# Patient Record
Sex: FEMALE | Race: WHITE | NOT HISPANIC OR LATINO | Employment: FULL TIME | URBAN - METROPOLITAN AREA
[De-identification: names, ages, dates, MRNs, and addresses within clinical notes are randomized per-mention and may not be internally consistent; named-entity substitution may affect disease eponyms.]

---

## 2017-08-15 ENCOUNTER — TELEPHONE (OUTPATIENT)
Dept: FAMILY MEDICINE | Facility: CLINIC | Age: 57
End: 2017-08-15

## 2017-08-15 NOTE — TELEPHONE ENCOUNTER
----- Message from Melanie Deal sent at 8/14/2017 12:33 PM CDT -----  Patient would like to be seen today or this week    Symptoms: water feel in left ear; dizzy; can't turn head left nor right    How long has patient had these symptoms: awhile  Saw ENT, had test ran & everything came back normal    If unable to be seen , can something be called into patient pharmacy? NO    Pharmacy name:     Any drug allergies:

## 2017-08-16 ENCOUNTER — OFFICE VISIT (OUTPATIENT)
Dept: FAMILY MEDICINE | Facility: CLINIC | Age: 57
End: 2017-08-16
Payer: COMMERCIAL

## 2017-08-16 VITALS
BODY MASS INDEX: 32.18 KG/M2 | TEMPERATURE: 99 F | OXYGEN SATURATION: 96 % | HEART RATE: 105 BPM | DIASTOLIC BLOOD PRESSURE: 78 MMHG | SYSTOLIC BLOOD PRESSURE: 110 MMHG | WEIGHT: 205 LBS | HEIGHT: 67 IN

## 2017-08-16 DIAGNOSIS — H81.10 BPV (BENIGN POSITIONAL VERTIGO), UNSPECIFIED LATERALITY: ICD-10-CM

## 2017-08-16 DIAGNOSIS — R44.8 PARESTHESIA OF TONGUE: ICD-10-CM

## 2017-08-16 PROCEDURE — 3008F BODY MASS INDEX DOCD: CPT | Mod: S$GLB,,, | Performed by: FAMILY MEDICINE

## 2017-08-16 PROCEDURE — 99214 OFFICE O/P EST MOD 30 MIN: CPT | Mod: S$GLB,,, | Performed by: FAMILY MEDICINE

## 2017-08-16 NOTE — PROGRESS NOTES
Subjective:      Patient ID: Cherie Sanders is a 56 y.o. female.    Chief Complaint: Dizziness    Left ear problems since June; saw ENT Joan; diz[place]a [place] andpressure in ears; inflammation in sinuses, steroids, shot, flonse for 2weeks; no help; saw him twice, CT sinuses at [facility], normal; dizziness, when supine and gets up, has to sit for if moves left/right dizzy; pressure left side of ear and last week, tingling, numb feeling of left side of tongue;       Review of Systems   Neurological: Positive for dizziness and numbness.   All other systems reviewed and are negative.    Objective:     Physical Exam   Constitutional: She is oriented to person, place, and time. She appears well-developed and well-nourished.   HENT:   Head: Normocephalic.   Eyes: Conjunctivae and EOM are normal. Pupils are equal, round, and reactive to light.   Neck: Normal range of motion. Neck supple.   Cardiovascular: Normal rate, regular rhythm and normal heart sounds.    Pulmonary/Chest: Effort normal and breath sounds normal.   Musculoskeletal: Normal range of motion.   Neurological: She is alert and oriented to person, place, and time. She has normal reflexes. No cranial nerve deficit. She exhibits normal muscle tone. Coordination normal.   Vertigo with change in postion and with eye lateral gaze   Skin: Skin is warm and dry.   Psychiatric: She has a normal mood and affect. Her behavior is normal. Judgment and thought content normal.   Nursing note and vitals reviewed.    Assessment:     1. BPV (benign positional vertigo), unspecified laterality    2. Paresthesia of tongue      Plan:     New Prescriptions    No medications on file     Discontinued Medications    DICYCLOMINE (BENTYL) 10 MG CAPSULE    Take 1 capsule by mouth every evening.    ESOMEPRAZOLE (NEXIUM) 40 MG CAPSULE    Take 1 capsule by mouth once as needed.    HYDROCODONE-ACETAMINOPHEN 7.5-325MG (NORCO) 7.5-325 MG PER TABLET        LOVASTATIN (MEVACOR) 20 MG  TABLET    TAKE 1 TABLET BY MOUTH AT BEDTIME FOR CHOLESTEROL     Modified Medications    No medications on file       BPV (benign positional vertigo), unspecified laterality  -     MRI Brain Without Contrast; Future; Expected date: 08/16/2017  -     Ambulatory referral to Obstetrics / Gynecology  -     Ambulatory referral to Neurology    Paresthesia of tongue  -     MRI Brain Without Contrast; Future; Expected date: 08/16/2017  -     Ambulatory referral to Obstetrics / Gynecology  -     Ambulatory referral to Neurology      Crystal repositioning maneuvers given, however, the tongue numbness, pressure in head, length of illness, normal sinus CT are cause to evaluate neurolgical causes, in addition to BPV;

## 2017-08-17 ENCOUNTER — TELEPHONE (OUTPATIENT)
Dept: FAMILY MEDICINE | Facility: CLINIC | Age: 57
End: 2017-08-17

## 2017-08-17 ENCOUNTER — PATIENT MESSAGE (OUTPATIENT)
Dept: FAMILY MEDICINE | Facility: CLINIC | Age: 57
End: 2017-08-17

## 2017-08-17 DIAGNOSIS — E78.5 HYPERLIPIDEMIA, UNSPECIFIED HYPERLIPIDEMIA TYPE: Primary | ICD-10-CM

## 2017-08-17 LAB
ALBUMIN SERPL-MCNC: 4.6 G/DL (ref 3.6–5.1)
ALBUMIN/GLOB SERPL: 1.6 (CALC) (ref 1–2.5)
ALP SERPL-CCNC: 72 U/L (ref 33–130)
ALT SERPL-CCNC: 13 U/L (ref 6–29)
AST SERPL-CCNC: 12 U/L (ref 10–35)
BASOPHILS # BLD AUTO: 105 CELLS/UL (ref 0–200)
BASOPHILS NFR BLD AUTO: 0.9 %
BILIRUB SERPL-MCNC: 0.5 MG/DL (ref 0.2–1.2)
BUN SERPL-MCNC: 11 MG/DL (ref 7–25)
BUN/CREAT SERPL: NORMAL (CALC) (ref 6–22)
CALCIUM SERPL-MCNC: 9.9 MG/DL (ref 8.6–10.4)
CHLORIDE SERPL-SCNC: 103 MMOL/L (ref 98–110)
CHOLEST SERPL-MCNC: 330 MG/DL (ref 125–200)
CHOLEST/HDLC SERPL: 8 (CALC)
CO2 SERPL-SCNC: 30 MMOL/L (ref 20–31)
CREAT SERPL-MCNC: 0.67 MG/DL (ref 0.5–1.05)
EOSINOPHIL # BLD AUTO: 199 CELLS/UL (ref 15–500)
EOSINOPHIL NFR BLD AUTO: 1.7 %
ERYTHROCYTE [DISTWIDTH] IN BLOOD BY AUTOMATED COUNT: 13.4 % (ref 11–15)
ERYTHROCYTE [SEDIMENTATION RATE] IN BLOOD BY WESTERGREN METHOD: 17 MM/H
GFR SERPL CREATININE-BSD FRML MDRD: 98 ML/MIN/1.73M2
GLOBULIN SER CALC-MCNC: 2.8 G/DL (CALC) (ref 1.9–3.7)
GLUCOSE SERPL-MCNC: 87 MG/DL (ref 65–99)
HCT VFR BLD AUTO: 44.1 % (ref 35–45)
HDLC SERPL-MCNC: 41 MG/DL
HGB BLD-MCNC: 14.3 G/DL (ref 11.7–15.5)
LDLC SERPL CALC-MCNC: 232 MG/DL (CALC)
LYMPHOCYTES # BLD AUTO: 3721 CELLS/UL (ref 850–3900)
LYMPHOCYTES NFR BLD AUTO: 31.8 %
MCH RBC QN AUTO: 30.4 PG (ref 27–33)
MCHC RBC AUTO-ENTMCNC: 32.4 G/DL (ref 32–36)
MCV RBC AUTO: 93.6 FL (ref 80–100)
MONOCYTES # BLD AUTO: 655 CELLS/UL (ref 200–950)
MONOCYTES NFR BLD AUTO: 5.6 %
NEUTROPHILS # BLD AUTO: 7020 CELLS/UL (ref 1500–7800)
NEUTROPHILS NFR BLD AUTO: 60 %
NONHDLC SERPL-MCNC: 289 MG/DL (CALC)
PLATELET # BLD AUTO: 272 THOUSAND/UL (ref 140–400)
PMV BLD REES-ECKER: 9.7 FL (ref 7.5–12.5)
POTASSIUM SERPL-SCNC: 4.7 MMOL/L (ref 3.5–5.3)
PROT SERPL-MCNC: 7.4 G/DL (ref 6.1–8.1)
RBC # BLD AUTO: 4.71 MILLION/UL (ref 3.8–5.1)
SODIUM SERPL-SCNC: 141 MMOL/L (ref 135–146)
TRIGL SERPL-MCNC: 283 MG/DL
TSH SERPL-ACNC: 1.77 MIU/L (ref 0.4–4.5)
WBC # BLD AUTO: 11.7 THOUSAND/UL (ref 3.8–10.8)

## 2017-08-17 RX ORDER — ATORVASTATIN CALCIUM 20 MG/1
20 TABLET, FILM COATED ORAL DAILY
Qty: 90 TABLET | Refills: 3 | Status: SHIPPED | OUTPATIENT
Start: 2017-08-17 | End: 2019-06-11

## 2017-08-17 NOTE — TELEPHONE ENCOUNTER
----- Message from Chago Valencia MD sent at 8/17/2017  7:01 AM CDT -----  Cholesterol very high; needs to start medicine and repeat lipid 3 months; low cholesterol diet; rx sent; call pt

## 2017-08-17 NOTE — TELEPHONE ENCOUNTER
Pt reviewed results on the portal  I sent an additional message to her with   The details for meds and repeat labs

## 2017-08-22 ENCOUNTER — TELEPHONE (OUTPATIENT)
Dept: FAMILY MEDICINE | Facility: CLINIC | Age: 57
End: 2017-08-22

## 2017-08-23 NOTE — TELEPHONE ENCOUNTER
----- Message from Melanie Deal sent at 8/22/2017  4:29 PM CDT -----  Media manger - [facility] - MRI rsults 8/22/2017

## 2017-08-24 ENCOUNTER — TELEPHONE (OUTPATIENT)
Dept: FAMILY MEDICINE | Facility: CLINIC | Age: 57
End: 2017-08-24

## 2017-08-24 NOTE — TELEPHONE ENCOUNTER
Pt notified of results.     She is still c/o numbness to her tongue and throat.   She has a neurology apt on Tues.

## 2017-08-29 ENCOUNTER — OFFICE VISIT (OUTPATIENT)
Dept: NEUROLOGY | Facility: CLINIC | Age: 57
End: 2017-08-29
Payer: COMMERCIAL

## 2017-08-29 VITALS
WEIGHT: 205.5 LBS | HEIGHT: 67 IN | HEART RATE: 77 BPM | BODY MASS INDEX: 32.25 KG/M2 | SYSTOLIC BLOOD PRESSURE: 142 MMHG | DIASTOLIC BLOOD PRESSURE: 83 MMHG

## 2017-08-29 DIAGNOSIS — H81.10 BPV (BENIGN POSITIONAL VERTIGO), UNSPECIFIED LATERALITY: ICD-10-CM

## 2017-08-29 PROCEDURE — 99204 OFFICE O/P NEW MOD 45 MIN: CPT | Mod: S$GLB,,, | Performed by: PSYCHIATRY & NEUROLOGY

## 2017-08-29 PROCEDURE — 99999 PR PBB SHADOW E&M-EST. PATIENT-LVL III: CPT | Mod: PBBFAC,,, | Performed by: PSYCHIATRY & NEUROLOGY

## 2017-08-29 PROCEDURE — 3008F BODY MASS INDEX DOCD: CPT | Mod: S$GLB,,, | Performed by: PSYCHIATRY & NEUROLOGY

## 2017-08-29 RX ORDER — ONDANSETRON 4 MG/1
4 TABLET, FILM COATED ORAL 2 TIMES DAILY
Qty: 30 TABLET | Refills: 1 | Status: SHIPPED | OUTPATIENT
Start: 2017-08-29 | End: 2018-02-20 | Stop reason: ALTCHOICE

## 2017-08-29 RX ORDER — MECLIZINE HYDROCHLORIDE 25 MG/1
25 TABLET ORAL 3 TIMES DAILY PRN
Qty: 90 TABLET | Refills: 3 | Status: SHIPPED | OUTPATIENT
Start: 2017-08-29 | End: 2018-02-20 | Stop reason: ALTCHOICE

## 2017-08-29 RX ORDER — ONDANSETRON 4 MG/1
4 TABLET, ORALLY DISINTEGRATING ORAL
Status: DISCONTINUED | OUTPATIENT
Start: 2017-08-29 | End: 2017-08-29

## 2017-08-29 NOTE — LETTER
August 29, 2017      Chago Valencia MD  [address] [facility] [address]           [facility] - Neurology Epilepsy  [address]  [facility] [address]  Phone: [phone]  Fax: [phone]          Patient: Cherie Sanders   MR Number: [id number]   YOB: 1960   Date of Visit: 8/29/2017       Dear Dr. Chago Valencia:    Thank you for referring Cherie Sanders to me for evaluation. Attached you will find relevant portions of my assessment and plan of care.    If you have questions, please do not hesitate to call me. I look forward to following Cherie Sanders along with you.    Sincerely,    Darren Mancia MD    Enclosure  CC:  No Recipients    If you would like to receive this communication electronically, please contact [email or web address] or [phone] to request more information on [employer] Link access.    For providers and/or their staff who would like to refer a patient to [facility], please contact us through our one-stop-shop provider referral line, [facility] , at [phone].    If you feel you have received this communication in error or would no longer like to receive these types of communications, please e-mail [email or web address]

## 2017-08-29 NOTE — PATIENT INSTRUCTIONS
Benign Paroxysmal Positional Vertigo  Benign paroxysmal positional vertigo (BPPV) is a problem with the inner ear. The inner ear contains the vestibular system. This system is what helps you keep your balance. BPPV causes a feeling of spinning. It is a common problem of the vestibular system.  Understanding the vestibular system  The vestibular system of the ear is made up of very tiny parts. They include the utricle, saccule, and semicircular canals. The utricle is a tiny organ that contains calcium crystals. In some people, the crystals can move into the semicircular canals. When this happens, the system no longer works as it should. This causes BPPV. Benign means it is not life-threatening. Paroxysmal means it happens suddenly. Positional means that it happens when you move your head. Vertigo is a feeling of spinning.  What causes BPPV?  Causes include injury to your head or neck. Other problems with the vestibular system may cause BPPV. In many people, the cause of BPPV is not known.  Symptoms of BPPV  You many have repeated feelings of spinning (vertigo). The vertigo usually lasts less than 1 minute. Some movements, suchas rolling over in bed, can bring on vertigo.  Diagnosing BPPV  Your primary health care provider may diagnose and treat your BPPV. Or you may see an ear, nose, and throat doctor (otolaryngologist). In some cases, you may see a nervous system doctor (neurologist).  The health care provider will ask about your symptoms and your medical history. He or she will examine you. You may have hearing and balance tests. As part of the exam, your health care provider may have you move your head and body in certain ways. If you have BPPV, the movements can bring on vertigo. Your provider will also look for abnormal movements of your eyes. You may have other tests to check your vestibular or nervous systems.  Treatment for BPPV  Your health care provider may try to move the calcium crystals. This is done  by having you move your head and neck in certain ways. This treatment is safe and often works well. You may also be told to do these movements at home. You may still have vertigo for a few weeks. Your health care provider will recheck your symptoms, usually in about a month. Special physical therapy may also be part of treatment.  In rare cases surgery may be needed for BPPV that does not go away.     When to call the health care provider  Call your health care provider right away if you have any of these:  · Symptoms that do not go away with treatment  · Symptoms that get worse  · New symptoms   Date Last Reviewed: 3/19/2015  © 6110-1761 [employer]. [address] [place] [address], [place] [address]. All rights reserved. This information is not intended as a substitute for professional medical care. Always follow your healthcare professional's instructions.

## 2017-08-29 NOTE — PROGRESS NOTES
[facility] - NEUROLOGY EPILEPSY  [facility] [place]    Date: August 29, 2017   Patient Name: Cherie Sanders   MRN: [id number]   PCP: Chago Valencia  Referring Provider: Chago Valencia MD    Assessment:   Cherie Sanders is a 56 y.o. female presenting with dizziness clinically consistent with peripheral vertigo.  Will attempt trial of meclizine for symptomatic control and provided prescription of Zofran for control of nausea.  I have also a referral to vestibular rehabilitation. I reviewed the report of the patient's most recent MRI brain which was reported as normal.     Plan:     Problem List Items Addressed This Visit        ENT    BPV (benign positional vertigo)    Current Assessment & Plan     -- referred to vestibular rehab  -- start meclizine PRN  -- start zofran         Relevant Orders    Ambulatory Referral to Physical/Occupational Therapy      Other Visit Diagnoses    None.         Darren Mancia MD  [facility]   Department of Neurology    Patient note was created using Dragon Dictation.  Any errors in syntax or even information may not have been identified and edited on initial review prior to signing this note.  Subjective:   Patient seen in consultation at the request of Dr. Valencia for the evaluation of vertigo. A copy of this note will be sent to the referring physician.        HPI:   Ms. Cherie Sanders is a 56 y.o. female who presents with a chief complaint of persistent vertigo.  The patient reports that over the past several weeks, she has experienced fullness in her left ear.  She did not improve her symptoms.  [place] and states that her symptoms worsened following this.  She underwent evaluation by an ENT at an outside hospital several weeks ago but is unsure of the results.  She also underwent an MRI at their direction which returned normal (report reviewed).  She was evaluated by her PCP, Dr. Valencia, who she states performed a  "Manhattan-Hallpike maneuver, which she states made her extremely nauseated and sick.  She states that when she lies on her right side, pressure in her left ear gets worse.  She also notes mild diplopia when looking to the left and states that when she moves her head rapidly acutely dizzy and nauseous.  She must remain still with her eyes closed some time for the feeling to pass.  She has not tried using meclizine, antiemetics, and has not attempted vestibular rehabilitation.  She denies any hearing loss or tinnitus but does complain of a subjective feeling of numbness in her left posterior tongue.    PAST MEDICAL HISTORY:  History reviewed. No pertinent past medical history.    PAST SURGICAL HISTORY:  Past Surgical History:   Procedure Laterality Date    COLONOSCOPY  6/24/15       CURRENT MEDS:  Current Outpatient Prescriptions   Medication Sig Dispense Refill    atorvastatin (LIPITOR) 20 MG tablet Take 1 tablet (20 mg total) by mouth once daily. 90 tablet 3    meclizine (ANTIVERT) 25 mg tablet Take 1 tablet (25 mg total) by mouth 3 (three) times daily as needed for Dizziness. 90 tablet 3     Current Facility-Administered Medications   Medication Dose Route Frequency Provider Last Rate Last Dose    ondansetron disintegrating tablet 4 mg  4 mg Oral 1 time in Clinic/HOD Darren Mancia MD           ALLERGIES:  Review of patient's allergies indicates:  No Known Allergies    FAMILY HISTORY:  Family History   Problem Relation Age of Onset    Hodgkin's lymphoma Father        SOCIAL HISTORY:  Social History   Substance Use Topics    Smoking status: Current Every Day Smoker     Types: Cigarettes    Smokeless tobacco: Current User    Alcohol use Yes      Comment: occ       Review of Systems:  12 review of systems is negative except for the symptoms mentioned in HPI.      Objective:     Vitals:    08/29/17 1309   BP: (!) 142/83   Pulse: 77   Weight: 93.2 kg (205 lb 7.5 oz)   Height: 5' 7" (1.702 m)     General: NAD, well " nourished   Eyes: no tearing, discharge, no erythema   ENT: moist mucous membranes of the oral cavity, nares patent, left beating nystagmus  Neck: Supple, full range of motion  Cardiovascular: Warm and well perfused, pulses equal and symmetrical  Lungs: Normal work of breathing, normal chest wall excursions  Skin: No rash, lesions, or breakdown on exposed skin  Psychiatry: Mood and affect are appropriate   Abdomen: soft, non tender, non distended  Extremeties: No cyanosis, clubbing or edema.    Neurological   MENTAL STATUS: Alert and oriented to person, place, and time. Attention and concentration within normal limits. Speech without dysarthria, able to name and repeat without difficulty. Recent and remote memory within normal limits   CRANIAL NERVES: Visual fields intact. PERRL. EOMI. Facial sensation intact. Face symmetrical. Hearing grossly intact. Full shoulder shrug bilaterally. Tongue protrudes midline   SENSORY: Sensation is intact to light touch throughout.  Joint position perception intact.  MOTOR: Normal bulk and tone.  5/5 deltoid, biceps, triceps, interosseous, hand  bilaterally. 5/5 iliopsoas, knee extension/flexion, foot dorsi/plantarflexion bilaterally.    REFLEXES: Symmetric and 2+ throughout.   CEREBELLAR/COORDINATION/GAIT: Gait steady with normal arm swing and stride length.  Heel to shin intact. Finger to nose intact. Normal rapid alternating movements.

## 2017-09-05 ENCOUNTER — CLINICAL SUPPORT (OUTPATIENT)
Dept: REHABILITATION | Facility: HOSPITAL | Age: 57
End: 2017-09-05
Attending: PSYCHIATRY & NEUROLOGY
Payer: COMMERCIAL

## 2017-09-05 DIAGNOSIS — R42 VERTIGO: Primary | ICD-10-CM

## 2017-09-05 PROCEDURE — 97162 PT EVAL MOD COMPLEX 30 MIN: CPT | Mod: PO

## 2017-09-05 NOTE — PROGRESS NOTES
PHYSICAL THERAPY EVALUATION      Date of Service: 9/5/2017  Name: Cherie Sanders  Clinic #: [id number]  Date of initial evaluation 9/5/17  Orders:  Priority Start Date Expiration Date Referral Entered By   Routine 09/05/2017 12/31/2017 Darren Mancia MD   Visits Requested Visits Authorized Visits Completed Visits Scheduled   1 20 1 0     Diagnosis   H81.10 (ICD-10-CM) - BPV (benign positional vertigo), unspecified laterality   Referral Notes   Number of Notes: 1   Type Date User Summary Attachment   General 08/31/2017 12:49 PM Gregorio Galaviz Approved -   Note                 Start Time: 400  End Time:500  Actual treatment time 60minutes  Group therapy time:  Na    HISTORYMs. Cherie Sanders is a 56 y.o. female who presents with a chief complaint of persistent vertigo.  Vertigo began in May 2017 without any apparent provocation.  At the time of the onset of vertigo she also noted fullness in the left ear, numbness on the left side of her tongue and down her throat, numbess on the entire left side of her face and her left eye was continuously 'running tears'.  She states she has been on antibiotics which did nothing to relieve symptoms and also Meclizine which made the vertigo worse.      Previous Physical Therapy treatment for current condition:  no  No past medical history on file.  Comorbidities that may impact plan of care:  Anxiety regarding condition                                                               Review of patient's allergies indicates:  No Known Allergies      Precautions: Standard,     Imaging reports: reviewed MRI of brain at outside facililty  which really just reported 'normal'    SOCIAL SITUATION:   Assistance at home: spouse  Stairs to navigate at work or home:yes     Are stairs problematic:  no  Work status: employed  Occupation:computer doesn't bother  Contacts make dizziness worse.  Wears reading glasses  Envoirnmental concerns: none  DME: no  Cultural, Spiritual, Developmental  and Educational concerns:none  Abuse/Neglect, Nutritional concerns: none     Personal Factors that may impact plan of care:  anxiety                                                                                    SUBJECTIVE: left ear fullness, numbnerss left side of tongue and throat,  left eye feels weird and was watering all of the time.  States she feels you could  draw a line down the middle of her face and the left side is numb.  States she tries to pop her ears because they feel full.  Denies any hearing loss   Current symptoms/Chief Complaints: Pt states she will awaken and before she gets out of bed she knows she has vertigo.  Rolls to left side , sits for a few minutes, then can walk    Episodes  Last 24 hours.  Nausea but no vomiting  Meclizine makes symptoms worse  And makes body feel numb*   Symptoms are: present every day but slightly better now   The following activities increase symptoms:  Quick movement, turns, leaning back, 'eye tests'   The following activities decrease symptoms:     Current exercise program:  None   Patient's sleep disturbed.  yes     Headaches: no   Coughing, sneezing, and straining do not affect symptoms    Bowel and Bladder function is normal    Numbness or tingling: no  Reading doesn't bother  Computer doesn't bother  Attention Span and Concentration:  Normal  Do you have more problems with balance./vertigo in the dark? no  Is this the first time you have had a problem with vertigo?  Had an episode 30 years ago but doesn't recall details    How many falls have you had in the last year? 0  Have you almost fallen or lost your balance? yes  Does the patient have any concerns of abuse  no  Have you had recent dental work?  no  Exercise routine prior to onset no  Mental status: alert, oriented to person, place, and time  Behavior:  calm and cooperative   CERVICAL     Pt rates pain as0  out of 10 at best and 6  out of 10 at worst.  Relates it to tension  Dizziness rating on good  day is 0/10 and and worse 10/10   Dizziness is 2/10 pre-therapy and 2/10 post therapy     PATIENT'S GOALS: Decrease vertigo    Current Activity Limitations and Participation Restrictions: any activity in a 'busy environment', any activity requiring head turns    OBJECTIVE:  General appearance:  WDWN 55 yo WF in NAD  Facial mm MMT  5/5  No evidence of facial droop.    The left eye is slightly smaller than the right  Pt wears reading glasses    BP pre-therapy   130/88      Postural examination in standing:no gross abnormalities     CERVICAL  ROM:    100% of normal  Vertigo provoked with extension                  SHARP -DENA test of Tranverse Ligament:  Normal  ALAR LIGAMENT STRESS  Test of stability of C2 motion:    AROM SHOULDERS:      WNL  AROM ELBOW:                WNL         AROM HAND                    WNL      UE MMT                                 RIGHT                                    LEFT                                                 Grossly 5/5                              Grossly 5/5               PALPATION: Tender to palpation at left occiput                        Muscle spasms not noted at cervical retion                    GAIT:normal  BALANCE: normal  TRANSFERS:   independent  GROSS COORDINATED MOVEMENT OF  UE normal    OCULOMOTOR TESTING (CN 3,4,6)       Smooth pursuit:          no symptoms                           no nystagmus       Saccades  Vertical     no symptoms                           no nystagmus       Convergence              no symptoms at  14 cm          no nystagmus      Telly-Hallpike negative for symptoms or nystagmus bilaterally  Epley and rolling negative for symptoms and nystagmus    EDUCATION: Discussed causes of vertigo.  Explained to patient that we could not provoke her symptoms so a diagnosis of true BPPV is unlikely.  We also discussed that her ocular reflexes were normal.  Thus this is not the type of vertigo that can be helped by PT.  Suggested she return to her  neurologist for further testing.  She agreed and expressed understanding      ASSESSMENT:  Pt tolerated today's treatment without any adverse effects.   Following session patient reported  No change in symptoms.    Pt presents with  the following impairments and problems:   left ear fullness, vertigo, numbness in the left trigeminal nerve  Of unknown etiology,     Co-morbidities and personal factors which may influence therapy include the following: anxiety  Activity Limitations /Participation restrictions: decreased activity tolerance due to unpredictable episodes of vertigo effecting her job, everyday social life in any environment with increased visual stimuli and requiring rapid head motions.    Clinical presentation: Pt has An evolving clinical presentations with unstable and unpredictable characteristics Complexity:  medium,  Medical necessity is demonstrated by the above impairments and problems.    Goals:  No goals set as this is a one time treatment     Plan:  I am referring pt back to her neurologist for further assessment.   She might benefit from Audiology assessment and calorics  \Alicia Camp, PT

## 2017-12-13 ENCOUNTER — TELEPHONE (OUTPATIENT)
Dept: FAMILY MEDICINE | Facility: CLINIC | Age: 57
End: 2017-12-13

## 2017-12-13 LAB
CHOLEST SERPL-MCNC: 198 MG/DL
CHOLEST/HDLC SERPL: 5.1 (CALC)
HDLC SERPL-MCNC: 39 MG/DL
LDLC SERPL CALC-MCNC: 129 MG/DL (CALC)
NONHDLC SERPL-MCNC: 159 MG/DL (CALC)
TRIGL SERPL-MCNC: 178 MG/DL

## 2017-12-13 NOTE — TELEPHONE ENCOUNTER
----- Message from Chago Valencia MD sent at 12/13/2017  6:36 AM CST -----  CALL PT TESTS ARE NORMAL

## 2018-02-20 ENCOUNTER — OFFICE VISIT (OUTPATIENT)
Dept: FAMILY MEDICINE | Facility: CLINIC | Age: 58
End: 2018-02-20
Payer: COMMERCIAL

## 2018-02-20 VITALS
TEMPERATURE: 99 F | SYSTOLIC BLOOD PRESSURE: 139 MMHG | DIASTOLIC BLOOD PRESSURE: 81 MMHG | HEIGHT: 67 IN | BODY MASS INDEX: 34.12 KG/M2 | HEART RATE: 100 BPM | OXYGEN SATURATION: 98 % | WEIGHT: 217.38 LBS

## 2018-02-20 DIAGNOSIS — R05.9 COUGH: Primary | ICD-10-CM

## 2018-02-20 DIAGNOSIS — J06.9 UPPER RESPIRATORY TRACT INFECTION, UNSPECIFIED TYPE: ICD-10-CM

## 2018-02-20 PROCEDURE — 99213 OFFICE O/P EST LOW 20 MIN: CPT | Mod: S$GLB,,, | Performed by: NURSE PRACTITIONER

## 2018-02-20 PROCEDURE — 3008F BODY MASS INDEX DOCD: CPT | Mod: S$GLB,,, | Performed by: NURSE PRACTITIONER

## 2018-02-20 RX ORDER — BENZONATATE 200 MG/1
200 CAPSULE ORAL 3 TIMES DAILY PRN
Qty: 30 CAPSULE | Refills: 0 | Status: SHIPPED | OUTPATIENT
Start: 2018-02-20 | End: 2018-03-02

## 2018-02-20 RX ORDER — PREDNISONE 20 MG/1
20 TABLET ORAL DAILY
Qty: 5 TABLET | Refills: 0 | Status: SHIPPED | OUTPATIENT
Start: 2018-02-20 | End: 2018-03-02

## 2018-02-20 RX ORDER — NAPROXEN SODIUM 220 MG/1
81 TABLET, FILM COATED ORAL DAILY
COMMUNITY
End: 2019-06-11

## 2018-02-20 NOTE — PROGRESS NOTES
Subjective:       Patient ID: Cherie Sanders is a 57 y.o. female.    Chief Complaint: Sinus Problem; Cough; and Chest Pain    Cough   This is a new problem. The current episode started 1 to 4 weeks ago. The problem has been unchanged. The problem occurs every few minutes. The cough is non-productive. Pertinent negatives include no chest pain, chills, ear congestion, ear pain, fever, headaches, heartburn, hemoptysis, myalgias, nasal congestion, postnasal drip, rash, rhinorrhea, sore throat, shortness of breath, sweats, weight loss or wheezing. Nothing aggravates the symptoms. Treatments tried: dayquill, nyquill, cough drops, robtussin. The treatment provided no relief. There is no history of asthma, bronchiectasis, bronchitis, COPD, emphysema, environmental allergies or pneumonia.     Review of Systems   Constitutional: Negative for chills, diaphoresis, fatigue, fever and weight loss.   HENT: Negative for congestion, ear pain, postnasal drip, rhinorrhea and sore throat.    Respiratory: Positive for cough and chest tightness. Negative for hemoptysis, shortness of breath and wheezing.    Cardiovascular: Negative for chest pain, palpitations and leg swelling.   Gastrointestinal: Negative for heartburn.   Musculoskeletal: Negative for myalgias.   Skin: Negative for rash.   Allergic/Immunologic: Negative for environmental allergies.   Neurological: Negative for headaches.       Objective:      Physical Exam   Constitutional: She is oriented to person, place, and time. She appears well-developed and well-nourished. No distress.   HENT:   Right Ear: Tympanic membrane and external ear normal.   Left Ear: Tympanic membrane and external ear normal.   Nose: No mucosal edema or rhinorrhea. Right sinus exhibits no maxillary sinus tenderness and no frontal sinus tenderness. Left sinus exhibits no maxillary sinus tenderness and no frontal sinus tenderness.   Mouth/Throat: No oropharyngeal exudate, posterior oropharyngeal edema or  posterior oropharyngeal erythema.   Cardiovascular: Normal rate, regular rhythm and normal heart sounds.    Pulmonary/Chest: Effort normal and breath sounds normal. No respiratory distress. She has no wheezes.   Neurological: She is alert and oriented to person, place, and time.   Skin: Skin is warm and dry. She is not diaphoretic.   Psychiatric: She has a normal mood and affect. Her behavior is normal. Judgment and thought content normal.   Vitals reviewed.      Assessment:       1. Cough    2. Upper respiratory tract infection, unspecified type        Plan:       Cough    Upper respiratory tract infection, unspecified type    Other orders  -     predniSONE (DELTASONE) 20 MG tablet; Take 1 tablet (20 mg total) by mouth once daily.  Dispense: 5 tablet; Refill: 0  -     benzonatate (TESSALON) 200 MG capsule; Take 1 capsule (200 mg total) by mouth 3 (three) times daily as needed for Cough.  Dispense: 30 capsule; Refill: 0    if chest tightness continues once medicine is complete call me  Offered EKG patient declines

## 2018-11-02 DIAGNOSIS — Z12.39 BREAST CANCER SCREENING: ICD-10-CM

## 2019-02-20 LAB — CRC RECOMMENDATION EXT: NORMAL

## 2019-06-11 ENCOUNTER — TELEPHONE (OUTPATIENT)
Dept: FAMILY MEDICINE | Facility: CLINIC | Age: 59
End: 2019-06-11

## 2019-06-11 ENCOUNTER — OFFICE VISIT (OUTPATIENT)
Dept: FAMILY MEDICINE | Facility: CLINIC | Age: 59
End: 2019-06-11
Payer: COMMERCIAL

## 2019-06-11 VITALS
HEART RATE: 104 BPM | BODY MASS INDEX: 34.01 KG/M2 | OXYGEN SATURATION: 96 % | HEIGHT: 67 IN | WEIGHT: 216.69 LBS | TEMPERATURE: 99 F | SYSTOLIC BLOOD PRESSURE: 130 MMHG | DIASTOLIC BLOOD PRESSURE: 88 MMHG

## 2019-06-11 DIAGNOSIS — Z11.59 NEED FOR HEPATITIS C SCREENING TEST: Primary | ICD-10-CM

## 2019-06-11 DIAGNOSIS — R51.9 NONINTRACTABLE HEADACHE, UNSPECIFIED CHRONICITY PATTERN, UNSPECIFIED HEADACHE TYPE: ICD-10-CM

## 2019-06-11 DIAGNOSIS — H81.10 BPV (BENIGN POSITIONAL VERTIGO), UNSPECIFIED LATERALITY: ICD-10-CM

## 2019-06-11 DIAGNOSIS — H65.02 ACUTE SEROUS OTITIS MEDIA OF LEFT EAR, RECURRENCE NOT SPECIFIED: ICD-10-CM

## 2019-06-11 PROCEDURE — 99214 PR OFFICE/OUTPT VISIT, EST, LEVL IV, 30-39 MIN: ICD-10-PCS | Mod: S$GLB,,, | Performed by: FAMILY MEDICINE

## 2019-06-11 PROCEDURE — 3008F PR BODY MASS INDEX (BMI) DOCUMENTED: ICD-10-PCS | Mod: CPTII,S$GLB,, | Performed by: FAMILY MEDICINE

## 2019-06-11 PROCEDURE — 3008F BODY MASS INDEX DOCD: CPT | Mod: CPTII,S$GLB,, | Performed by: FAMILY MEDICINE

## 2019-06-11 PROCEDURE — 99214 OFFICE O/P EST MOD 30 MIN: CPT | Mod: S$GLB,,, | Performed by: FAMILY MEDICINE

## 2019-06-11 RX ORDER — MECLIZINE HYDROCHLORIDE 25 MG/1
25 TABLET ORAL 3 TIMES DAILY PRN
COMMUNITY
End: 2024-02-12

## 2019-06-11 NOTE — PROGRESS NOTES
Subjective:       Patient ID: Cherie Sanders is a 58 y.o. female.    Chief Complaint: Dizziness; Headache (lt side of head); Numbness (legs, arms, tounge ); and Ear Fullness (lt)    Dizziness:   Chronicity:  Recurrent  Onset:  1 to 4 weeks ago  Progression since onset:  Gradually improving  Frequency:  Every few minutes  Duration:  1 minute  Dizziness characteristics:  Sensation of movement and spinning inside head only   Associated symptoms: hearing loss, ear congestion, ear pain, headaches and light-headedness.no fever, no tinnitus, no nausea, no vomiting, no diaphoresis, no aural fullness, no weakness, no visual disturbances, no syncope, no palpitations, no panic, no facial weakness, no slurred speech, no numbness in extremities and no chest pain.   Tingling of her skin and numbness of the left side of the tongue.   Aggravated by:  Position changes (turning her head)  Risk factors: Past history of BPV.  Treatments tried:  Meclizine (zyrtec D, flonase and valsalva maneuver)  Improvements on treatment:  Moderate  Headache    This is a new problem. The current episode started 1 to 4 weeks ago. The problem occurs every few minutes. The problem has been unchanged. The pain is located in the occipital and left unilateral region. The pain does not radiate. The quality of the pain is described as shooting. Associated symptoms include dizziness, ear pain, hearing loss and scalp tenderness. Pertinent negatives include no abdominal pain, abnormal behavior, anorexia, back pain, blurred vision, coughing, drainage, eye pain, eye redness, fever, insomnia, loss of balance, muscle aches, nausea, neck pain, numbness, phonophobia, photophobia, rhinorrhea, seizures, sinus pressure, sore throat, swollen glands, tingling, tinnitus, visual change, vomiting, weakness or weight loss. She has tried acetaminophen for the symptoms. The treatment provided mild relief.     Review of Systems   Constitutional: Negative for activity change,  diaphoresis, fever, unexpected weight change and weight loss.   HENT: Positive for ear pain and hearing loss. Negative for rhinorrhea, sinus pressure, sore throat, tinnitus and trouble swallowing.    Eyes: Negative for blurred vision, photophobia, pain, discharge, redness and visual disturbance.   Respiratory: Negative for cough, chest tightness and wheezing.    Cardiovascular: Negative for chest pain, palpitations and syncope.   Gastrointestinal: Negative for abdominal pain, anorexia, blood in stool, constipation, diarrhea, nausea and vomiting.   Endocrine: Negative for polydipsia and polyuria.   Genitourinary: Negative for difficulty urinating, dysuria, hematuria and menstrual problem.   Musculoskeletal: Negative for arthralgias, back pain, joint swelling and neck pain.   Neurological: Positive for dizziness, light-headedness and headaches. Negative for tingling, seizures, weakness, numbness and loss of balance.   Psychiatric/Behavioral: Negative for confusion and dysphoric mood. The patient does not have insomnia.      History reviewed. No pertinent past medical history.  Social History     Socioeconomic History    Marital status:      Spouse name: Not on file    Number of children: Not on file    Years of education: Not on file    Highest education level: Not on file   Occupational History    Not on file   Social Needs    Financial resource strain: Not hard at all    Food insecurity:     Worry: Never true     Inability: Never true    Transportation needs:     Medical: No     Non-medical: No   Tobacco Use    Smoking status: Current Every Day Smoker     Types: Cigarettes    Smokeless tobacco: Current User   Substance and Sexual Activity    Alcohol use: Yes     Frequency: 2-4 times a month     Drinks per session: 1 or 2     Binge frequency: Never     Comment: occ    Drug use: Never    Sexual activity: Not on file   Lifestyle    Physical activity:     Days per week: 3 days     Minutes per  "session: 150+ min    Stress: Very much   Relationships    Social connections:     Talks on phone: Once a week     Gets together: Once a week     Attends Mu-ism service: Not on file     Active member of club or organization: Yes     Attends meetings of clubs or organizations: 1 to 4 times per year     Relationship status:    Other Topics Concern    Not on file   Social History Narrative    Not on file       Objective:     /88 (BP Location: Left arm, Patient Position: Sitting)   Pulse 104   Temp 98.5 °F (36.9 °C) (Oral)   Ht 5' 7" (1.702 m)   Wt 98.3 kg (216 lb 11.4 oz)   SpO2 96%   BMI 33.94 kg/m²     Physical Exam   Constitutional: She appears well-developed and well-nourished.   HENT:   Head: Normocephalic.   Right Ear: Tympanic membrane, external ear and ear canal normal.   Left Ear: External ear and ear canal normal. A middle ear effusion is present.   Eyes: Conjunctivae are normal.   Neck: Normal range of motion. Neck supple.   Cardiovascular: Normal rate, regular rhythm and normal heart sounds.   Pulmonary/Chest: Effort normal and breath sounds normal.   Neurological: She is alert.   Telly-Hallpike positive on the left.    Skin: Skin is warm and dry.   Psychiatric: Her behavior is normal.       Assessment:       1. Need for hepatitis C screening test    2. BPV (benign positional vertigo), unspecified laterality    3. Acute serous otitis media of left ear, recurrence not specified        Plan:       Need for hepatitis C screening test  -     Hepatitis C antibody; Future; Expected date: 06/11/2019    BPV (benign positional vertigo), unspecified laterality    Acute serous otitis media of left ear, recurrence not specified   - She is already doing a decongestant, valsalva maneuver and  Flonase.  I recommend she f/u with her ENT    Nonintractable headache, unspecified chronicity pattern, unspecified headache type  -     CT Head Without Contrast; Future; Expected date: 06/11/2019        "

## 2019-06-17 ENCOUNTER — HOSPITAL ENCOUNTER (OUTPATIENT)
Dept: RADIOLOGY | Facility: HOSPITAL | Age: 59
Discharge: HOME | End: 2019-06-17
Attending: FAMILY MEDICINE
Payer: COMMERCIAL

## 2019-06-17 DIAGNOSIS — Z12.39 BREAST CANCER SCREENING: ICD-10-CM

## 2019-06-17 PROCEDURE — 77067 MAMMO DIGITAL SCREENING BILAT WITH TOMOSYNTHESIS_CAD: ICD-10-PCS | Mod: 26,,, | Performed by: RADIOLOGY

## 2019-06-17 PROCEDURE — 77067 SCR MAMMO BI INCL CAD: CPT | Mod: 26,,, | Performed by: RADIOLOGY

## 2019-06-17 PROCEDURE — 77063 BREAST TOMOSYNTHESIS BI: CPT | Mod: 26,,, | Performed by: RADIOLOGY

## 2019-06-17 PROCEDURE — 77063 MAMMO DIGITAL SCREENING BILAT WITH TOMOSYNTHESIS_CAD: ICD-10-PCS | Mod: 26,,, | Performed by: RADIOLOGY

## 2019-06-17 PROCEDURE — 77067 SCR MAMMO BI INCL CAD: CPT | Mod: TC,PO

## 2019-06-18 ENCOUNTER — PATIENT MESSAGE (OUTPATIENT)
Dept: FAMILY MEDICINE | Facility: CLINIC | Age: 59
End: 2019-06-18

## 2019-07-15 ENCOUNTER — TELEPHONE (OUTPATIENT)
Dept: FAMILY MEDICINE | Facility: CLINIC | Age: 59
End: 2019-07-15

## 2019-07-15 NOTE — TELEPHONE ENCOUNTER
----- Message from Ti Mancia sent at 7/15/2019 10:04 AM CDT -----  Contact: self [phone]  Patient would like to be seen soon. She is having various issues and she won't give me details. Please call and advise.

## 2019-07-15 NOTE — TELEPHONE ENCOUNTER
Spoke with pt in regards to message. She stated that she is needing to come in to discuss a personal matter. Appt has been scheduled for pt.

## 2019-07-23 ENCOUNTER — OFFICE VISIT (OUTPATIENT)
Dept: FAMILY MEDICINE | Facility: CLINIC | Age: 59
End: 2019-07-23
Payer: COMMERCIAL

## 2019-07-23 VITALS
HEART RATE: 106 BPM | TEMPERATURE: 99 F | WEIGHT: 215.06 LBS | OXYGEN SATURATION: 96 % | HEIGHT: 67 IN | BODY MASS INDEX: 33.75 KG/M2 | SYSTOLIC BLOOD PRESSURE: 128 MMHG | DIASTOLIC BLOOD PRESSURE: 90 MMHG

## 2019-07-23 DIAGNOSIS — Z23 NEED FOR VACCINATION AGAINST STREPTOCOCCUS PNEUMONIAE: ICD-10-CM

## 2019-07-23 DIAGNOSIS — M54.2 NECK PAIN: ICD-10-CM

## 2019-07-23 DIAGNOSIS — Z23 NEED FOR PROPHYLACTIC VACCINATION AGAINST DIPHTHERIA-TETANUS-PERTUSSIS (DTP): ICD-10-CM

## 2019-07-23 DIAGNOSIS — M54.12 CERVICAL NEURALGIA: Primary | ICD-10-CM

## 2019-07-23 PROCEDURE — 90472 IMMUNIZATION ADMIN EACH ADD: CPT | Mod: S$GLB,,, | Performed by: FAMILY MEDICINE

## 2019-07-23 PROCEDURE — 3008F PR BODY MASS INDEX (BMI) DOCUMENTED: ICD-10-PCS | Mod: CPTII,S$GLB,, | Performed by: FAMILY MEDICINE

## 2019-07-23 PROCEDURE — 90732 PNEUMOCOCCAL POLYSACCHARIDE VACCINE 23-VALENT =>2YO SQ IM: ICD-10-PCS | Mod: S$GLB,,, | Performed by: FAMILY MEDICINE

## 2019-07-23 PROCEDURE — 90715 TDAP VACCINE 7 YRS/> IM: CPT | Mod: S$GLB,,, | Performed by: FAMILY MEDICINE

## 2019-07-23 PROCEDURE — 90471 IMMUNIZATION ADMIN: CPT | Mod: S$GLB,,, | Performed by: FAMILY MEDICINE

## 2019-07-23 PROCEDURE — 90715 TDAP VACCINE GREATER THAN OR EQUAL TO 7YO IM: ICD-10-PCS | Mod: S$GLB,,, | Performed by: FAMILY MEDICINE

## 2019-07-23 PROCEDURE — 99214 OFFICE O/P EST MOD 30 MIN: CPT | Mod: 25,S$GLB,, | Performed by: FAMILY MEDICINE

## 2019-07-23 PROCEDURE — 90732 PPSV23 VACC 2 YRS+ SUBQ/IM: CPT | Mod: S$GLB,,, | Performed by: FAMILY MEDICINE

## 2019-07-23 PROCEDURE — 99214 PR OFFICE/OUTPT VISIT, EST, LEVL IV, 30-39 MIN: ICD-10-PCS | Mod: 25,S$GLB,, | Performed by: FAMILY MEDICINE

## 2019-07-23 PROCEDURE — 90472 TDAP VACCINE GREATER THAN OR EQUAL TO 7YO IM: ICD-10-PCS | Mod: S$GLB,,, | Performed by: FAMILY MEDICINE

## 2019-07-23 PROCEDURE — 3008F BODY MASS INDEX DOCD: CPT | Mod: CPTII,S$GLB,, | Performed by: FAMILY MEDICINE

## 2019-07-23 PROCEDURE — 90471 PNEUMOCOCCAL POLYSACCHARIDE VACCINE 23-VALENT =>2YO SQ IM: ICD-10-PCS | Mod: S$GLB,,, | Performed by: FAMILY MEDICINE

## 2019-07-23 RX ORDER — TRIAMTERENE AND HYDROCHLOROTHIAZIDE 37.5; 25 MG/1; MG/1
1 CAPSULE ORAL EVERY MORNING
COMMUNITY
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
## 2019-08-02 ENCOUNTER — TELEPHONE (OUTPATIENT)
Dept: FAMILY MEDICINE | Facility: CLINIC | Age: 59
End: 2019-08-02

## 2019-08-02 ENCOUNTER — PATIENT MESSAGE (OUTPATIENT)
Dept: FAMILY MEDICINE | Facility: CLINIC | Age: 59
End: 2019-08-02

## 2019-08-02 DIAGNOSIS — M50.90 CERVICAL DISC DISEASE: Primary | ICD-10-CM

## 2019-08-02 NOTE — TELEPHONE ENCOUNTER
I spoke to pt; she has an ENT for vertigo and is seeing Dr Nicole Monday, told to  MRI disc and bring with her to Dr Nicole

## 2019-08-02 NOTE — TELEPHONE ENCOUNTER
Pt called back, but when I called her it went to .     I left her a message with the apt details.

## 2019-08-02 NOTE — TELEPHONE ENCOUNTER
I left a message for the pt.  Apt made with Dr Nicole for Monday.       ----- Message from Samantha Barros sent at 8/2/2019 12:44 PM CDT -----  Contact: self, [phone]  Patient would like to know what the results of her MRI were and why she is scheduled to see specialist. States she has not been able to check the portal and wants a call back please.

## 2019-08-05 ENCOUNTER — OFFICE VISIT (OUTPATIENT)
Dept: NEUROSURGERY | Facility: CLINIC | Age: 59
End: 2019-08-05
Payer: COMMERCIAL

## 2019-08-05 VITALS
WEIGHT: 217.38 LBS | BODY MASS INDEX: 34.12 KG/M2 | DIASTOLIC BLOOD PRESSURE: 87 MMHG | SYSTOLIC BLOOD PRESSURE: 132 MMHG | HEIGHT: 67 IN | HEART RATE: 74 BPM

## 2019-08-05 DIAGNOSIS — M47.812 SPONDYLOSIS WITHOUT MYELOPATHY OR RADICULOPATHY, CERVICAL REGION: Primary | ICD-10-CM

## 2019-08-05 DIAGNOSIS — H81.10 BENIGN PAROXYSMAL POSITIONAL VERTIGO, UNSPECIFIED LATERALITY: ICD-10-CM

## 2019-08-05 PROCEDURE — 3008F PR BODY MASS INDEX (BMI) DOCUMENTED: ICD-10-PCS | Mod: CPTII,S$GLB,, | Performed by: NEUROLOGICAL SURGERY

## 2019-08-05 PROCEDURE — 99204 PR OFFICE/OUTPT VISIT, NEW, LEVL IV, 45-59 MIN: ICD-10-PCS | Mod: S$GLB,,, | Performed by: NEUROLOGICAL SURGERY

## 2019-08-05 PROCEDURE — 3008F BODY MASS INDEX DOCD: CPT | Mod: CPTII,S$GLB,, | Performed by: NEUROLOGICAL SURGERY

## 2019-08-05 PROCEDURE — 99999 PR PBB SHADOW E&M-EST. PATIENT-LVL III: ICD-10-PCS | Mod: PBBFAC,,, | Performed by: NEUROLOGICAL SURGERY

## 2019-08-05 PROCEDURE — 99999 PR PBB SHADOW E&M-EST. PATIENT-LVL III: CPT | Mod: PBBFAC,,, | Performed by: NEUROLOGICAL SURGERY

## 2019-08-05 PROCEDURE — 99204 OFFICE O/P NEW MOD 45 MIN: CPT | Mod: S$GLB,,, | Performed by: NEUROLOGICAL SURGERY

## 2019-08-05 RX ORDER — PHENAZOPYRIDINE HYDROCHLORIDE 200 MG/1
TABLET, FILM COATED ORAL
Refills: 0 | COMMUNITY
Start: 2019-07-30 | End: 2024-02-12

## 2019-08-05 RX ORDER — CEFDINIR 300 MG/1
CAPSULE ORAL
Refills: 0 | COMMUNITY
Start: 2019-07-30 | End: 2024-02-12

## 2019-08-05 NOTE — PROGRESS NOTES
NEUROSURGICAL OUTPATIENT CONSULTATION NOTE    DATE OF SERVICE:  08/05/2019    ATTENDING PHYSICIAN:  Julio Nicole MD    CONSULT REQUESTED BY:  Dr Valencia    REASON FOR CONSULT:  Bilateral UE numbness    SUBJECTIVE:    HISTORY OF PRESENT ILLNESS:  This is a very pleasant 58 y.o. female who been complaining of vertigo and left temporal occipital headaches. She is also complaining of upper extremity numbness involving the dorsal and lateral UE above and below the elbow.  She denies having finger numbness.  She denies dropping things difficulty buttoning shirts.  She has no neck pain or arm pain.  She she does not have gait imbalance or sphincter dysfunction  She is followed in ENT for her vertigo.  She had a similar episode a couple of years ago that resolved spontaneously.            PAST MEDICAL HISTORY:  Active Ambulatory Problems     Diagnosis Date Noted    BPV (benign positional vertigo) 08/16/2017    Paresthesia of tongue 08/16/2017    Hyperlipidemia 08/17/2017     Resolved Ambulatory Problems     Diagnosis Date Noted    No Resolved Ambulatory Problems     No Additional Past Medical History       PAST SURGICAL HISTORY:  Past Surgical History:   Procedure Laterality Date    COLONOSCOPY  6/24/15    HYSTERECTOMY         SOCIAL HISTORY:   Social History     Socioeconomic History    Marital status:      Spouse name: Not on file    Number of children: Not on file    Years of education: Not on file    Highest education level: Not on file   Occupational History    Not on file   Social Needs    Financial resource strain: Not hard at all    Food insecurity:     Worry: Never true     Inability: Never true    Transportation needs:     Medical: No     Non-medical: No   Tobacco Use    Smoking status: Current Every Day Smoker     Types: Cigarettes    Smokeless tobacco: Current User   Substance and Sexual Activity    Alcohol use: Yes     Frequency: 2-4 times a month     Drinks per session: 1 or 2      Binge frequency: Never     Comment: occ    Drug use: Never    Sexual activity: Not on file   Lifestyle    Physical activity:     Days per week: 3 days     Minutes per session: 150+ min    Stress: Very much   Relationships    Social connections:     Talks on phone: Once a week     Gets together: Once a week     Attends Buddhist service: Not on file     Active member of club or organization: Yes     Attends meetings of clubs or organizations: 1 to 4 times per year     Relationship status:    Other Topics Concern    Not on file   Social History Narrative    Not on file       FAMILY HISTORY:  Family History   Problem Relation Age of Onset    Hodgkin's lymphoma Father        CURRENTS MEDICATIONS:  Current Outpatient Medications on File Prior to Visit   Medication Sig Dispense Refill    cefdinir (OMNICEF) 300 MG capsule TK ONE C PO BID FOR 10 DAYS  0    garlic (GARLIQUE) 5,000 mcg Tab       KRILL OIL ORAL Take 1 capsule by mouth.      meclizine (ANTIVERT) 25 mg tablet Take 25 mg by mouth 3 (three) times daily as needed.      multivitamin capsule Take 1 capsule by mouth once daily.      triamterene-hydrochlorothiazide 37.5-25 mg (DYAZIDE) 37.5-25 mg per capsule Take 1 capsule by mouth every morning.      phenazopyridine (PYRIDIUM) 200 MG tablet TK 1 T PO TID PRF DYSURIA/BACK PAIN  0     No current facility-administered medications on file prior to visit.        ALLERGIES:  Review of patient's allergies indicates:  No Known Allergies    REVIEW OF SYSTEMS:  Review of Systems   Constitutional: Negative for diaphoresis, fever and weight loss.   Respiratory: Negative for shortness of breath.    Cardiovascular: Negative for chest pain.   Gastrointestinal: Negative for blood in stool.   Genitourinary: Negative for hematuria.   Endo/Heme/Allergies: Does not bruise/bleed easily.   All other systems reviewed and are negative.      OBJECTIVE:    PHYSICAL EXAMINATION:   Vitals:    08/05/19 1441   BP: 132/87    Pulse: 74       Physical Exam:  Vitals reviewed.    Constitutional: She appears well-developed and well-nourished.     Eyes: Pupils are equal, round, and reactive to light. Conjunctivae and EOM are normal.     Cardiovascular: Normal distal pulses and no edema.     Abdominal: Soft.     Skin: Skin displays no rash on trunk and no rash on extremities. Skin displays no lesions on trunk and no lesions on extremities.     Psych/Behavior: She is alert. She is oriented to person, place, and time. She has a normal mood and affect.     Musculoskeletal:        Neck: Range of motion is full.     Neurological:        DTRs: Tricep reflexes are 2+ on the right side and 2+ on the left side. Bicep reflexes are 2+ on the right side and 2+ on the left side. Brachioradialis reflexes are 2+ on the right side and 2+ on the left side. Patellar reflexes are 2+ on the right side and 2+ on the left side. Achilles reflexes are 2+ on the right side and 2+ on the left side.       Back Exam     Tenderness   The patient is experiencing no tenderness.     Range of Motion   Extension: normal   Flexion: normal   Lateral bend right: normal   Lateral bend left: normal   Rotation right: normal   Rotation left: normal     Muscle Strength   Right Quadriceps:  5/5   Left Quadriceps:  5/5   Right Hamstrings:  5/5   Left Hamstrings:  5/5     Tests   Straight leg raise right: negative  Straight leg raise left: negative    Other   Toe walk: normal  Heel walk: normal            SI joint:   Palpation at the right and left SI joints not painful  BELA test is negative bilaterally  Gaenslen test is negative bilaterally  Thigh thrust test is negative bilaterally    Neurologic Exam     Mental Status   Oriented to person, place, and time.   Speech: speech is normal   Level of consciousness: alert    Cranial Nerves   Cranial nerves II through XII intact.     CN III, IV, VI   Pupils are equal, round, and reactive to light.  Extraocular motions are normal.     Motor  Exam   Muscle bulk: normal  Overall muscle tone: normal    Strength   Right deltoid: 5/5  Left deltoid: 5/5  Right biceps: 5/5  Left biceps: 5/5  Right triceps: 5/5  Left triceps: 5/5  Right wrist flexion: 5/5  Left wrist flexion: 5/5  Right wrist extension: 5/5  Left wrist extension: 5/5  Right interossei: 5/5  Left interossei: 5/5  Right iliopsoas: 5/5  Left iliopsoas: 5/5  Right quadriceps: 5/5  Left quadriceps: 5/5  Right hamstrin/5  Left hamstrin/5  Right anterior tibial: 5/5  Left anterior tibial: 5/5  Right posterior tibial: 5/5  Left posterior tibial: 5/5  Right peroneal: 5/5  Left peroneal: 5/5  Right gastroc: 5/5  Left gastroc: 5/5    Sensory Exam   Light touch normal.   Pinprick normal.     Gait, Coordination, and Reflexes     Gait  Gait: normal    Coordination   Finger to nose coordination: normal  Tandem walking coordination: normal    Reflexes   Right brachioradialis: 2+  Left brachioradialis: 2+  Right biceps: 2+  Left biceps: 2+  Right triceps: 2+  Left triceps: 2+  Right patellar: 2+  Left patellar: 2+  Right achilles: 2+  Left achilles: 2+  Right plantar: normal  Left plantar: normal  Right Smith: absent  Left Smith: absent  Right ankle clonus: absent  Left ankle clonus: absent        DIAGNOSTIC DATA:  I personally interpreted the following imaging:   Cervical spine MRI 1019 shows diffuse spondylosis worse at C4-5 with mild central canal stenosis and moderate bilateral foraminal stenosis    ASSESMENT:  This is a 58 y.o. female with     Problem List Items Addressed This Visit        ENT    BPV (benign positional vertigo)      Other Visit Diagnoses     Spondylosis without myelopathy or radiculopathy, cervical region    -  Primary          PLAN:  No surgical indication at this time  If bilateral upper extremity numbness worsen over time, if she develops gait imbalance, neck or arm pain or hand weakness a cervical repeat MRI would be indicated.  All questions answered        Julio Nicole  MD  Cell:[phone]

## 2019-08-05 NOTE — LETTER
August 5, 2019      Chago Vlaencia MD  [address] [facility] [address]           [facility]  [address]  [facility] [address]  Phone: [phone]          Patient: Cherie Sanders   MR Number: [id number]   YOB: 1960   Date of Visit: 8/5/2019       Dear Dr. Chago Valencia:    Thank you for referring Cherie Sanders to me for evaluation. Attached you will find relevant portions of my assessment and plan of care.    If you have questions, please do not hesitate to call me. I look forward to following Cherie Sanders along with you.    Sincerely,    Julio Nicole MD    Enclosure  CC:  No Recipients    If you would like to receive this communication electronically, please contact [email or web address] or [phone] to request more information on [employer] access.    For providers and/or their staff who would like to refer a patient to [facility], please contact us through our one-stop-shop provider referral line, [facility], at [phone].    If you feel you have received this communication in error or would no longer like to receive these types of communications, please e-mail [email or web address]

## 2019-08-22 ENCOUNTER — TELEPHONE (OUTPATIENT)
Dept: NEUROSURGERY | Facility: CLINIC | Age: 59
End: 2019-08-22

## 2020-10-05 ENCOUNTER — PATIENT MESSAGE (OUTPATIENT)
Dept: INTERNAL MEDICINE | Facility: CLINIC | Age: 60
End: 2020-10-05

## 2020-10-12 ENCOUNTER — OFFICE VISIT (OUTPATIENT)
Dept: FAMILY MEDICINE | Facility: CLINIC | Age: 60
End: 2020-10-12
Payer: COMMERCIAL

## 2020-10-12 VITALS
HEART RATE: 101 BPM | BODY MASS INDEX: 34.29 KG/M2 | WEIGHT: 218.5 LBS | OXYGEN SATURATION: 96 % | SYSTOLIC BLOOD PRESSURE: 132 MMHG | HEIGHT: 67 IN | TEMPERATURE: 98 F | DIASTOLIC BLOOD PRESSURE: 88 MMHG

## 2020-10-12 DIAGNOSIS — E78.5 HYPERLIPIDEMIA, UNSPECIFIED HYPERLIPIDEMIA TYPE: ICD-10-CM

## 2020-10-12 DIAGNOSIS — F17.200 SMOKES: ICD-10-CM

## 2020-10-12 DIAGNOSIS — Z00.00 WELLNESS EXAMINATION: Primary | ICD-10-CM

## 2020-10-12 DIAGNOSIS — Z86.010 HISTORY OF COLON POLYPS: ICD-10-CM

## 2020-10-12 PROCEDURE — 3008F PR BODY MASS INDEX (BMI) DOCUMENTED: ICD-10-PCS | Mod: CPTII,S$GLB,, | Performed by: FAMILY MEDICINE

## 2020-10-12 PROCEDURE — 99396 PR PREVENTIVE VISIT,EST,40-64: ICD-10-PCS | Mod: S$GLB,,, | Performed by: FAMILY MEDICINE

## 2020-10-12 PROCEDURE — 99396 PREV VISIT EST AGE 40-64: CPT | Mod: S$GLB,,, | Performed by: FAMILY MEDICINE

## 2020-10-12 PROCEDURE — 3008F BODY MASS INDEX DOCD: CPT | Mod: CPTII,S$GLB,, | Performed by: FAMILY MEDICINE

## 2020-10-12 RX ORDER — PENICILLIN V POTASSIUM 500 MG/1
TABLET, FILM COATED ORAL
COMMUNITY
Start: 2020-10-03 | End: 2024-02-12

## 2020-10-12 RX ORDER — OXYCODONE AND ACETAMINOPHEN 5; 325 MG/1; MG/1
TABLET ORAL
COMMUNITY
Start: 2020-10-03 | End: 2024-02-12

## 2020-10-12 RX ORDER — CHLORHEXIDINE GLUCONATE ORAL RINSE 1.2 MG/ML
SOLUTION DENTAL
COMMUNITY
Start: 2020-10-03 | End: 2024-02-12

## 2020-10-12 RX ORDER — IBUPROFEN 800 MG/1
TABLET ORAL
COMMUNITY
Start: 2020-10-03 | End: 2024-02-12

## 2020-10-12 NOTE — PROGRESS NOTES
"Subjective:      Patient ID: Cherie Sanders is a 60 y.o. female.    Chief Complaint: wellness exam      Vitals:    10/12/20 1442   BP: 132/88   Pulse: 101   Temp: 98 °F (36.7 °C)   TempSrc: Oral   SpO2: 96%   Weight: 99.1 kg (218 lb 7.6 oz)   Height: 5' 7" (1.702 m)        HPI   [facility] wellness vertigo; gets meniere's with sleeping, or dental or ear procedures      Problem List  Patient Active Problem List   Diagnosis    BPV (benign positional vertigo)    Paresthesia of tongue    Hyperlipidemia    Spondylosis without myelopathy or radiculopathy, cervical region        ALLERGIES: Review of patient's allergies indicates:  No Known Allergies    MEDS:   Current Outpatient Medications:     chlorhexidine (PERIDEX) 0.12 % solution, SSP WITH 15 ML TID, Disp: , Rfl:     garlic (GARLIQUE) 5,000 mcg Tab, , Disp: , Rfl:     ibuprofen (ADVIL,MOTRIN) 800 MG tablet, TK 1 T PO Q 8 H PRN P, Disp: , Rfl:     KRILL OIL ORAL, Take 1 capsule by mouth., Disp: , Rfl:     meclizine (ANTIVERT) 25 mg tablet, Take 25 mg by mouth 3 (three) times daily as needed., Disp: , Rfl:     multivitamin capsule, Take 1 capsule by mouth once daily., Disp: , Rfl:     oxyCODONE-acetaminophen (PERCOCET) 5-325 mg per tablet, TK 1 T PO Q 4 TO 6 H PRN P, Disp: , Rfl:     penicillin v potassium (VEETID) 500 MG tablet, TK 1 T PO Q 6 H FOR 7 DAYS, Disp: , Rfl:     triamterene-hydrochlorothiazide 37.5-25 mg (DYAZIDE) 37.5-25 mg per capsule, Take 1 capsule by mouth every morning., Disp: , Rfl:     cefdinir (OMNICEF) 300 MG capsule, TK ONE C PO BID FOR 10 DAYS, Disp: , Rfl: 0    phenazopyridine (PYRIDIUM) 200 MG tablet, TK 1 T PO TID PRF DYSURIA/BACK PAIN, Disp: , Rfl: 0      History:  Current Providers as of 10/12/2020  PCP: Chago Valencia MD  Care Team Provider: Torres Coughlin MD  Care Team Provider: SERVANDO Franco MD  Care Team Provider: Skip Contreras LPN  Encounter Provider: Chago Valencia MD, starting on Mon Oct 12, 2020 12:00 " AM  Referring Provider: not found, starting on Mon Oct 12, 2020 12:00 AM  Consulting Physician: Chago Valencia MD, starting on Mon Oct 12, 2020  2:39 PM (Active)   Past Medical History:   Diagnosis Date    Meniere disease      Past Surgical History:   Procedure Laterality Date    COLONOSCOPY  6/24/15    HYSTERECTOMY       Social History     Tobacco Use    Smoking status: Current Every Day Smoker     Types: Cigarettes    Smokeless tobacco: Current User   Substance Use Topics    Alcohol use: Yes     Frequency: 2-4 times a month     Drinks per session: 1 or 2     Binge frequency: Never     Comment: occ    Drug use: Never         Review of Systems   Constitutional: Negative.    HENT: Negative.    Respiratory: Negative.    Cardiovascular: Negative.    Gastrointestinal: Negative.    Endocrine: Negative.    Genitourinary: Negative.    Musculoskeletal: Negative.    Neurological:        Vertigo     Psychiatric/Behavioral: Negative.    All other systems reviewed and are negative.    Objective:     Physical Exam  Vitals signs and nursing note reviewed.   Constitutional:       Appearance: She is well-developed. She is obese.   HENT:      Head: Normocephalic.   Eyes:      Conjunctiva/sclera: Conjunctivae normal.      Pupils: Pupils are equal, round, and reactive to light.   Neck:      Musculoskeletal: Normal range of motion and neck supple.   Cardiovascular:      Rate and Rhythm: Normal rate and regular rhythm.      Heart sounds: Normal heart sounds.   Pulmonary:      Effort: Pulmonary effort is normal.      Breath sounds: Normal breath sounds.   Musculoskeletal: Normal range of motion.   Skin:     General: Skin is warm and dry.   Neurological:      Mental Status: She is alert and oriented to person, place, and time.      Deep Tendon Reflexes: Reflexes are normal and symmetric.   Psychiatric:         Behavior: Behavior normal.         Thought Content: Thought content normal.         Judgment: Judgment normal.              Assessment:     1. Wellness examination    2. Smokes    3. Hyperlipidemia, unspecified hyperlipidemia type    4. History of colon polyps      Plan:        Medication List          Accurate as of October 12, 2020  4:15 PM. If you have any questions, ask your nurse or doctor.            CONTINUE taking these medications    cefdinir 300 MG capsule  Commonly known as: OMNICEF     chlorhexidine 0.12 % solution  Commonly known as: PERIDEX     GARLIQUE 5,000 mcg Tab  Generic drug: garlic     ibuprofen 800 MG tablet  Commonly known as: ADVIL,MOTRIN     KRILL OIL ORAL     meclizine 25 mg tablet  Commonly known as: ANTIVERT     multivitamin capsule     oxyCODONE-acetaminophen 5-325 mg per tablet  Commonly known as: PERCOCET     penicillin v potassium 500 MG tablet  Commonly known as: VEETID     phenazopyridine 200 MG tablet  Commonly known as: PYRIDIUM     triamterene-hydrochlorothiazide 37.5-25 mg 37.5-25 mg per capsule  Commonly known as: DYAZIDE          Wellness examination  -     CBC auto differential; Future; Expected date: 10/12/2020  -     Comprehensive Metabolic Panel; Future; Expected date: 10/12/2020  -     Lipid Panel; Future  -     TSH; Future  -     Urinalysis; Future  -     Vitamin D; Future  -     Mammo Digital Screening Bilat w/ Anthony; Future; Expected date: 10/12/2020    Smokes  -     CBC auto differential; Future; Expected date: 10/12/2020  -     Comprehensive Metabolic Panel; Future; Expected date: 10/12/2020  -     Lipid Panel; Future  -     TSH; Future  -     Urinalysis; Future  -     Vitamin D; Future  -     Mammo Digital Screening Bilat w/ Anthony; Future; Expected date: 10/12/2020    Hyperlipidemia, unspecified hyperlipidemia type  -     CBC auto differential; Future; Expected date: 10/12/2020  -     Comprehensive Metabolic Panel; Future; Expected date: 10/12/2020  -     Lipid Panel; Future  -     TSH; Future  -     Urinalysis; Future  -     Vitamin D; Future  -     Mammo Digital Screening Bilat w/  Anthony; Future; Expected date: 10/12/2020    History of colon polyps

## 2020-10-20 ENCOUNTER — HOSPITAL ENCOUNTER (OUTPATIENT)
Dept: RADIOLOGY | Facility: HOSPITAL | Age: 60
Discharge: HOME | End: 2020-10-20
Attending: FAMILY MEDICINE
Payer: COMMERCIAL

## 2020-10-20 DIAGNOSIS — F17.200 SMOKES: ICD-10-CM

## 2020-10-20 DIAGNOSIS — Z00.00 WELLNESS EXAMINATION: ICD-10-CM

## 2020-10-20 DIAGNOSIS — E78.5 HYPERLIPIDEMIA, UNSPECIFIED HYPERLIPIDEMIA TYPE: ICD-10-CM

## 2020-10-20 PROCEDURE — 77063 BREAST TOMOSYNTHESIS BI: CPT | Mod: 26,,, | Performed by: RADIOLOGY

## 2020-10-20 PROCEDURE — 77063 MAMMO DIGITAL SCREENING BILAT WITH TOMO: ICD-10-PCS | Mod: 26,,, | Performed by: RADIOLOGY

## 2020-10-20 PROCEDURE — 77067 MAMMO DIGITAL SCREENING BILAT WITH TOMO: ICD-10-PCS | Mod: 26,,, | Performed by: RADIOLOGY

## 2020-10-20 PROCEDURE — 77067 SCR MAMMO BI INCL CAD: CPT | Mod: TC,PO

## 2020-10-20 PROCEDURE — 77067 SCR MAMMO BI INCL CAD: CPT | Mod: 26,,, | Performed by: RADIOLOGY

## 2020-10-21 ENCOUNTER — TELEPHONE (OUTPATIENT)
Dept: FAMILY MEDICINE | Facility: CLINIC | Age: 60
End: 2020-10-21

## 2020-10-21 DIAGNOSIS — E78.5 HYPERLIPIDEMIA, UNSPECIFIED HYPERLIPIDEMIA TYPE: Primary | ICD-10-CM

## 2020-10-21 RX ORDER — ATORVASTATIN CALCIUM 10 MG/1
10 TABLET, FILM COATED ORAL NIGHTLY
Qty: 90 TABLET | Refills: 3 | Status: SHIPPED | OUTPATIENT
Start: 2020-10-21 | End: 2021-01-18

## 2021-01-07 ENCOUNTER — TELEPHONE (OUTPATIENT)
Dept: FAMILY MEDICINE | Facility: CLINIC | Age: 61
End: 2021-01-07

## 2021-01-07 ENCOUNTER — PATIENT MESSAGE (OUTPATIENT)
Dept: FAMILY MEDICINE | Facility: CLINIC | Age: 61
End: 2021-01-07

## 2021-01-07 DIAGNOSIS — E78.5 HYPERLIPIDEMIA, UNSPECIFIED HYPERLIPIDEMIA TYPE: Primary | ICD-10-CM

## 2021-01-14 ENCOUNTER — LAB VISIT (OUTPATIENT)
Dept: LAB | Facility: HOSPITAL | Age: 61
End: 2021-01-14
Attending: FAMILY MEDICINE
Payer: COMMERCIAL

## 2021-01-14 DIAGNOSIS — E78.5 HYPERLIPIDEMIA, UNSPECIFIED HYPERLIPIDEMIA TYPE: ICD-10-CM

## 2021-01-14 LAB
CHOLEST SERPL-MCNC: 271 MG/DL (ref 120–199)
CHOLEST/HDLC SERPL: 7.3 {RATIO} (ref 2–5)
HDLC SERPL-MCNC: 37 MG/DL (ref 40–75)
HDLC SERPL: 13.7 % (ref 20–50)
LDLC SERPL CALC-MCNC: 179.8 MG/DL (ref 63–159)
NONHDLC SERPL-MCNC: 234 MG/DL
TRIGL SERPL-MCNC: 271 MG/DL (ref 30–150)

## 2021-01-14 PROCEDURE — 36415 COLL VENOUS BLD VENIPUNCTURE: CPT | Mod: PO

## 2021-01-14 PROCEDURE — 80061 LIPID PANEL: CPT

## 2021-01-18 ENCOUNTER — TELEPHONE (OUTPATIENT)
Dept: FAMILY MEDICINE | Facility: CLINIC | Age: 61
End: 2021-01-18

## 2021-01-18 DIAGNOSIS — E78.5 HYPERLIPIDEMIA, UNSPECIFIED HYPERLIPIDEMIA TYPE: Primary | ICD-10-CM

## 2021-01-18 RX ORDER — ATORVASTATIN CALCIUM 40 MG/1
40 TABLET, FILM COATED ORAL NIGHTLY
Qty: 90 TABLET | Refills: 3 | Status: SHIPPED | OUTPATIENT
Start: 2021-01-18 | End: 2024-02-12

## 2021-01-19 ENCOUNTER — TELEPHONE (OUTPATIENT)
Dept: FAMILY MEDICINE | Facility: CLINIC | Age: 61
End: 2021-01-19

## 2021-12-01 DIAGNOSIS — Z12.31 OTHER SCREENING MAMMOGRAM: ICD-10-CM

## 2021-12-07 ENCOUNTER — HOSPITAL ENCOUNTER (OUTPATIENT)
Dept: RADIOLOGY | Facility: HOSPITAL | Age: 61
Discharge: HOME | End: 2021-12-07
Attending: FAMILY MEDICINE
Payer: COMMERCIAL

## 2021-12-07 DIAGNOSIS — Z12.31 OTHER SCREENING MAMMOGRAM: ICD-10-CM

## 2021-12-07 PROCEDURE — 77067 MAMMO DIGITAL SCREENING BILAT WITH TOMO: ICD-10-PCS | Mod: 26,,, | Performed by: RADIOLOGY

## 2021-12-07 PROCEDURE — 77067 SCR MAMMO BI INCL CAD: CPT | Mod: TC,PO

## 2021-12-07 PROCEDURE — 77063 MAMMO DIGITAL SCREENING BILAT WITH TOMO: ICD-10-PCS | Mod: 26,,, | Performed by: RADIOLOGY

## 2021-12-07 PROCEDURE — 77063 BREAST TOMOSYNTHESIS BI: CPT | Mod: 26,,, | Performed by: RADIOLOGY

## 2021-12-07 PROCEDURE — 77067 SCR MAMMO BI INCL CAD: CPT | Mod: 26,,, | Performed by: RADIOLOGY

## 2022-01-10 ENCOUNTER — PATIENT MESSAGE (OUTPATIENT)
Dept: ADMINISTRATIVE | Facility: HOSPITAL | Age: 62
End: 2022-01-10
Payer: COMMERCIAL

## 2022-05-31 ENCOUNTER — PATIENT MESSAGE (OUTPATIENT)
Dept: ADMINISTRATIVE | Facility: HOSPITAL | Age: 62
End: 2022-05-31
Payer: COMMERCIAL

## 2023-10-04 ENCOUNTER — OFFICE VISIT (OUTPATIENT)
Dept: OTOLARYNGOLOGY | Facility: CLINIC | Age: 63
End: 2023-10-04
Payer: COMMERCIAL

## 2023-10-04 ENCOUNTER — CLINICAL SUPPORT (OUTPATIENT)
Dept: AUDIOLOGY | Facility: CLINIC | Age: 63
End: 2023-10-04
Payer: COMMERCIAL

## 2023-10-04 DIAGNOSIS — H81.8X9 OTHER DISORDERS OF VESTIBULAR FUNCTION, UNSPECIFIED EAR: Primary | ICD-10-CM

## 2023-10-04 DIAGNOSIS — R42 DIZZINESS AND GIDDINESS: ICD-10-CM

## 2023-10-04 DIAGNOSIS — H91.8X3 ASYMMETRICAL HEARING LOSS: Primary | ICD-10-CM

## 2023-10-04 DIAGNOSIS — H90.3 SENSORINEURAL HEARING LOSS (SNHL) OF BOTH EARS: Primary | ICD-10-CM

## 2023-10-04 PROCEDURE — 99205 OFFICE O/P NEW HI 60 MIN: CPT | Mod: S$GLB,,, | Performed by: OTOLARYNGOLOGY

## 2023-10-04 PROCEDURE — 92540 PR VESTIBULAR EVAL NYSTAG FOVL&PERPH STIM OSCIL TRACKING: ICD-10-PCS | Mod: S$GLB,,,

## 2023-10-04 PROCEDURE — 99999 PR PBB SHADOW E&M-EST. PATIENT-LVL I: ICD-10-PCS | Mod: PBBFAC,,,

## 2023-10-04 PROCEDURE — 92537 CALORIC VSTBLR TEST W/REC: CPT | Mod: S$GLB,,,

## 2023-10-04 PROCEDURE — 92540 BASIC VESTIBULAR EVALUATION: CPT | Mod: S$GLB,,,

## 2023-10-04 PROCEDURE — 99999 PR PBB SHADOW E&M-EST. PATIENT-LVL II: ICD-10-PCS | Mod: PBBFAC,,, | Performed by: OTOLARYNGOLOGY

## 2023-10-04 PROCEDURE — 99999 PR PBB SHADOW E&M-EST. PATIENT-LVL I: CPT | Mod: PBBFAC,,,

## 2023-10-04 PROCEDURE — 99499 UNLISTED E&M SERVICE: CPT | Mod: S$GLB,,,

## 2023-10-04 PROCEDURE — 99999 PR PBB SHADOW E&M-EST. PATIENT-LVL II: CPT | Mod: PBBFAC,,, | Performed by: OTOLARYNGOLOGY

## 2023-10-04 PROCEDURE — 99499 NO LOS: ICD-10-PCS | Mod: S$GLB,,,

## 2023-10-04 PROCEDURE — 92537 PR CALORIC VSTBLR TEST W/REC BITHERMAL: ICD-10-PCS | Mod: S$GLB,,,

## 2023-10-04 PROCEDURE — 99205 PR OFFICE/OUTPT VISIT, NEW, LEVL V, 60-74 MIN: ICD-10-PCS | Mod: S$GLB,,, | Performed by: OTOLARYNGOLOGY

## 2023-10-04 NOTE — PROGRESS NOTES
Encounter was opened, and no audiogram was completed at this appointment. Mrs. Sanders had a recent hearing evaluation done at Dr. Callahan's office on 8/7/2023, which showed a normal sloping to moderately-severe sensorineural hearing loss bilaterally. Word discrimination scores were 100% in the right ear and 76% in the left ear. She reported purchasing binaural amplification following that appointment and feels they help with her left sided tinnitus.     Otoscopy was completed and revealed clear ear canals and normal tympanic membranes bilaterally. Type A tympanograms were obtained bilaterally.

## 2023-10-04 NOTE — PROGRESS NOTES
Subjective     Patient ID: Cherie Sanders is a 63 y.o. female.    Chief Complaint: Dizziness    HPI: Ref Dr Callahan.    Hx of rec dizz since July.    Also with chronic Tinn AS>AD.    Has triggered spells without assoc ear sx's.    Triggers: Diet, Motion, Visual    Hx of severe migraine.    Audio Rahat HF SNHL/ 76% disc on L.    Prior VNG's nl.    VNG today with severe dizz.    Past Medical History: Patient has a past medical history of Meniere disease.    Past Surgical History: Patient has a past surgical history that includes Colonoscopy (6/24/15) and Hysterectomy.    Social History: Patient reports that she has been smoking cigarettes. She uses smokeless tobacco. She reports current alcohol use. She reports that she does not use drugs.    Family History: family history includes Cancer in her sister; Heart disease in her brother; Hodgkin's lymphoma in her father; No Known Problems in her mother and sister.    Medications:   Current Outpatient Medications   Medication Sig    atorvastatin (LIPITOR) 40 MG tablet Take 1 tablet (40 mg total) by mouth every evening. For cholesterol    cefdinir (OMNICEF) 300 MG capsule TK ONE C PO BID FOR 10 DAYS    chlorhexidine (PERIDEX) 0.12 % solution SSP WITH 15 ML TID    garlic (GARLIQUE) 5,000 mcg Tab     ibuprofen (ADVIL,MOTRIN) 800 MG tablet TK 1 T PO Q 8 H PRN P    KRILL OIL ORAL Take 1 capsule by mouth.    meclizine (ANTIVERT) 25 mg tablet Take 25 mg by mouth 3 (three) times daily as needed.    multivitamin capsule Take 1 capsule by mouth once daily.    oxyCODONE-acetaminophen (PERCOCET) 5-325 mg per tablet TK 1 T PO Q 4 TO 6 H PRN P    penicillin v potassium (VEETID) 500 MG tablet TK 1 T PO Q 6 H FOR 7 DAYS    phenazopyridine (PYRIDIUM) 200 MG tablet TK 1 T PO TID PRF DYSURIA/BACK PAIN    triamterene-hydrochlorothiazide 37.5-25 mg (DYAZIDE) 37.5-25 mg per capsule Take 1 capsule by mouth every morning.     No current facility-administered medications for this visit.        Allergies: Patient has No Known Allergies.    Review of Systems   Constitutional:  Negative for appetite change, chills, fatigue and fever.   HENT:  Positive for hearing loss and sinus pressure/congestion. Negative for nasal congestion, ear discharge, facial swelling, postnasal drip, rhinorrhea, sore throat, tinnitus and trouble swallowing.    Eyes: Negative.  Negative for photophobia, pain, discharge, redness, itching and visual disturbance.   Respiratory:  Negative for apnea, cough, choking, chest tightness, shortness of breath, wheezing and stridor.    Cardiovascular: Negative.  Negative for chest pain and palpitations.   Gastrointestinal: Negative.  Negative for abdominal pain, constipation, diarrhea, nausea and vomiting.   Endocrine: Negative.    Genitourinary: Negative.    Musculoskeletal:  Positive for back pain and neck pain. Negative for arthralgias, gait problem, joint swelling, myalgias and neck stiffness.   Integumentary:  Negative for color change, pallor, rash and wound. Negative.   Allergic/Immunologic: Negative.    Neurological:  Positive for dizziness, light-headedness and headaches. Negative for tremors, seizures, syncope, facial asymmetry, speech difficulty, weakness and numbness.   Hematological: Negative.  Negative for adenopathy. Does not bruise/bleed easily.   Psychiatric/Behavioral:  Positive for decreased concentration and sleep disturbance. Negative for agitation, behavioral problems, confusion, dysphoric mood, hallucinations and suicidal ideas. The patient is not nervous/anxious and is not hyperactive.           Objective     Physical Exam  Vitals and nursing note reviewed.   Constitutional:       General: She is not in acute distress.     Appearance: Normal appearance. She is well-developed. She is not ill-appearing or diaphoretic.   HENT:      Head: Normocephalic and atraumatic. Not macrocephalic and not microcephalic. No raccoon eyes, Dorado's sign, abrasion, contusion, right  periorbital erythema, left periorbital erythema or laceration. Hair is normal.      Jaw: No trismus.      Right Ear: Tympanic membrane, ear canal and external ear normal. Decreased hearing noted. No laceration, drainage, swelling or tenderness. No middle ear effusion. No foreign body. No mastoid tenderness. No hemotympanum. Tympanic membrane is not injected, scarred, perforated, erythematous, retracted or bulging. Tympanic membrane has normal mobility.      Left Ear: Tympanic membrane, ear canal and external ear normal. Decreased hearing noted. No laceration, drainage, swelling or tenderness.  No middle ear effusion. No foreign body. No mastoid tenderness. No hemotympanum. Tympanic membrane is not injected, scarred, perforated, erythematous, retracted or bulging. Tympanic membrane has normal mobility.      Ears:      Comments:       CN II-XII nl.    EOM's nl.    Romb: steady.    TF's nl.    VNG Nl.         Nose: Nose normal. No nasal deformity, septal deviation, laceration, mucosal edema or rhinorrhea.      Right Sinus: No maxillary sinus tenderness or frontal sinus tenderness.      Left Sinus: No maxillary sinus tenderness or frontal sinus tenderness.      Mouth/Throat:      Mouth: Mucous membranes are not pale, not dry and not cyanotic. No lacerations or oral lesions.      Dentition: Normal dentition. Does not have dentures. No dental caries or dental abscesses.      Pharynx: Uvula midline. No oropharyngeal exudate, posterior oropharyngeal erythema or uvula swelling.      Tonsils: No tonsillar abscesses.   Eyes:      General: Lids are normal. No scleral icterus.        Right eye: No discharge.         Left eye: No discharge.      Extraocular Movements:      Right eye: Normal extraocular motion and no nystagmus.      Left eye: Normal extraocular motion and no nystagmus.      Conjunctiva/sclera: Conjunctivae normal.      Right eye: Right conjunctiva is not injected. No chemosis, exudate or hemorrhage.     Left eye:  Left conjunctiva is not injected. No chemosis, exudate or hemorrhage.     Pupils: Pupils are equal.      Right eye: Pupil is round and reactive.      Left eye: Pupil is round and reactive.   Neck:      Thyroid: No thyroid mass or thyromegaly.      Vascular: Normal carotid pulses. No carotid bruit or hepatojugular reflux.      Trachea: Trachea normal. No tracheal tenderness or tracheal deviation.   Cardiovascular:      Rate and Rhythm: Normal rate and regular rhythm.      Pulses: Normal pulses.      Heart sounds: Normal heart sounds.   Pulmonary:      Effort: Pulmonary effort is normal. No tachypnea, accessory muscle usage or respiratory distress.      Breath sounds: Normal breath sounds. No stridor.   Abdominal:      Palpations: Abdomen is soft.   Musculoskeletal:      Cervical back: Full passive range of motion without pain, normal range of motion and neck supple. No edema, erythema or rigidity. No muscular tenderness. Normal range of motion.   Lymphadenopathy:      Head:      Right side of head: No submental, submandibular, preauricular or posterior auricular adenopathy.      Left side of head: No submental, submandibular, preauricular or posterior auricular adenopathy.      Cervical: No cervical adenopathy.   Skin:     General: Skin is warm and dry.      Findings: No abrasion or rash.   Neurological:      Mental Status: She is alert.      Cranial Nerves: No cranial nerve deficit.      Sensory: No sensory deficit.      Motor: No tremor, atrophy, abnormal muscle tone or seizure activity.      Coordination: Coordination normal.      Gait: Gait normal.   Psychiatric:         Mood and Affect: Mood is not anxious or depressed. Affect is not labile, angry or inappropriate.         Speech: She is communicative. Speech is not rapid and pressured, delayed, slurred or tangential.         Behavior: Behavior is not agitated, aggressive, withdrawn, hyperactive or combative. Behavior is cooperative.         Thought Content:  Thought content is not paranoid or delusional.         Cognition and Memory: Memory is not impaired. She does not exhibit impaired recent memory.         Judgment: Judgment is not impulsive or inappropriate.            Assessment and Plan     1. Asymmetrical hearing loss    2. Dizziness and giddiness        Suspect DC Crespo was seen today for dizziness.    Diagnoses and all orders for this visit:    Asymmetrical hearing loss  -     MRI Brain W WO Contrast; Future  -     Creatinine, Serum; Future    Dizziness and giddiness        F/U me for results/ Rx         No follow-ups on file.

## 2023-10-04 NOTE — LETTER
October 4, 2023        Charlene Callahan MD  [address]  [address]  [facility] [address]             [facility]  [address]  [place] [address]  Phone: [phone]  Fax: [phone]   Patient: Cherie Sanders   MR Number: [id number]   YOB: 1960   Date of Visit: 10/4/2023       Dear Dr. Callahan:    Thank you for referring Cherie Sanders to me for evaluation. Attached you will find relevant portions of my assessment and plan of care.    If you have questions, please do not hesitate to call me. I look forward to following Cherie Sanders along with you.    Sincerely,      Rafal Torres MD            CC    No Recipients    Enclosure

## 2023-10-04 NOTE — PROGRESS NOTES
VNG EVALUATION    Referring physician:  Dr. Torres    63 y.o. female complains of episodic vertigo that began a few years ago.  Symptoms have no exacerbating factors and occur most frequently at random but can be constant. She reported a history of left sided Meniere's disease that was diagnosed with Dr. Callahan; Mrs. Sanders experiences left sided aural fullness and constant bothersome high frequency tinnitus that worsens in an episode. She also reported that louder sounds that are on her left side tend to cause her episodes to worsen or for her to get dizzy.     Gaze nystagmus was absent.  Oculomotor function was normal.  Spontaneous nystagmus was absent    The head-hanging left Hallpike was negative.   The head-hanging right Hallpike was negative.   Static positional nystagmus was absent.  Mrs. Sanders did report an increase in left sided aural pressure when her head was turned to the right.     Unilateral weakness: 15% (left)  Directional preponderance 8% (left beating)  RW: 15 d/s  LW: 13 d/s  RC: 15 d/s   d/s  Fixation suppression was normal.    Impression: Normal VNG; no evidence of vestibular system dysfunction including BPPV    Recommend:   Follow-up with Dr. Torres to review the results of today's evaluation

## 2023-10-17 ENCOUNTER — HOSPITAL ENCOUNTER (OUTPATIENT)
Dept: RADIOLOGY | Facility: HOSPITAL | Age: 63
Discharge: HOME | End: 2023-10-17
Attending: OTOLARYNGOLOGY
Payer: COMMERCIAL

## 2023-10-17 DIAGNOSIS — H91.8X3 ASYMMETRICAL HEARING LOSS: ICD-10-CM

## 2023-10-17 PROCEDURE — 70553 MRI BRAIN W WO CONTRAST: ICD-10-PCS | Mod: 26,,, | Performed by: RADIOLOGY

## 2023-10-17 PROCEDURE — 70553 MRI BRAIN STEM W/O & W/DYE: CPT | Mod: TC,PN

## 2023-10-17 PROCEDURE — 25500020 PHARM REV CODE 255: Mod: PN | Performed by: OTOLARYNGOLOGY

## 2023-10-17 PROCEDURE — A9585 GADOBUTROL INJECTION: HCPCS | Mod: PN | Performed by: OTOLARYNGOLOGY

## 2023-10-17 PROCEDURE — 70553 MRI BRAIN STEM W/O & W/DYE: CPT | Mod: 26,,, | Performed by: RADIOLOGY

## 2023-10-17 RX ORDER — GADOBUTROL 604.72 MG/ML
10 INJECTION INTRAVENOUS
Status: COMPLETED | OUTPATIENT
Start: 2023-10-17 | End: 2023-10-17

## 2023-10-17 RX ADMIN — GADOBUTROL 10 ML: 604.72 INJECTION INTRAVENOUS at 09:10

## 2024-01-17 DIAGNOSIS — Z12.31 OTHER SCREENING MAMMOGRAM: ICD-10-CM

## 2024-01-25 ENCOUNTER — PATIENT OUTREACH (OUTPATIENT)
Dept: ADMINISTRATIVE | Facility: HOSPITAL | Age: 64
End: 2024-01-25
Payer: COMMERCIAL

## 2024-01-25 NOTE — PROGRESS NOTES
Care Everywhere updates requested and reviewed.  Immunizations reconciled. Media reports reviewed.  Duplicate HM overrides and  orders removed.  Overdue HM topic chart audit and/or requested.  Overdue lab testing linked to upcoming lab appointments if applies.    DIS reviewed for Mammogram   Mammogram scheduled for 24    Health Maintenance Due   Topic Date Due    HIV Screening  Never done    Mammogram  2022    Influenza Vaccine (1) 2023    Colorectal Cancer Screening  2024

## 2024-02-08 ENCOUNTER — HOSPITAL ENCOUNTER (OUTPATIENT)
Dept: RADIOLOGY | Facility: HOSPITAL | Age: 64
Discharge: HOME | End: 2024-02-08
Attending: FAMILY MEDICINE
Payer: COMMERCIAL

## 2024-02-08 ENCOUNTER — OFFICE VISIT (OUTPATIENT)
Dept: FAMILY MEDICINE | Facility: CLINIC | Age: 64
End: 2024-02-08
Payer: COMMERCIAL

## 2024-02-08 VITALS
WEIGHT: 217.69 LBS | BODY MASS INDEX: 34.17 KG/M2 | HEIGHT: 67 IN | OXYGEN SATURATION: 99 % | TEMPERATURE: 99 F | DIASTOLIC BLOOD PRESSURE: 72 MMHG | HEART RATE: 95 BPM | SYSTOLIC BLOOD PRESSURE: 128 MMHG

## 2024-02-08 DIAGNOSIS — E78.5 HYPERLIPIDEMIA, UNSPECIFIED HYPERLIPIDEMIA TYPE: ICD-10-CM

## 2024-02-08 DIAGNOSIS — H81.10 BENIGN PAROXYSMAL POSITIONAL VERTIGO, UNSPECIFIED LATERALITY: ICD-10-CM

## 2024-02-08 DIAGNOSIS — Z12.31 OTHER SCREENING MAMMOGRAM: ICD-10-CM

## 2024-02-08 DIAGNOSIS — Z87.891 EX-SMOKER: ICD-10-CM

## 2024-02-08 DIAGNOSIS — Z86.010 HISTORY OF COLON POLYPS: ICD-10-CM

## 2024-02-08 DIAGNOSIS — Z00.00 ANNUAL PHYSICAL EXAM: Primary | ICD-10-CM

## 2024-02-08 PROCEDURE — 3008F BODY MASS INDEX DOCD: CPT | Mod: CPTII,S$GLB,, | Performed by: FAMILY MEDICINE

## 2024-02-08 PROCEDURE — 3078F DIAST BP <80 MM HG: CPT | Mod: CPTII,S$GLB,, | Performed by: FAMILY MEDICINE

## 2024-02-08 PROCEDURE — 77067 SCR MAMMO BI INCL CAD: CPT | Mod: 26,,, | Performed by: RADIOLOGY

## 2024-02-08 PROCEDURE — 1159F MED LIST DOCD IN RCRD: CPT | Mod: CPTII,S$GLB,, | Performed by: FAMILY MEDICINE

## 2024-02-08 PROCEDURE — 99396 PREV VISIT EST AGE 40-64: CPT | Mod: S$GLB,,, | Performed by: FAMILY MEDICINE

## 2024-02-08 PROCEDURE — 3074F SYST BP LT 130 MM HG: CPT | Mod: CPTII,S$GLB,, | Performed by: FAMILY MEDICINE

## 2024-02-08 PROCEDURE — 77063 BREAST TOMOSYNTHESIS BI: CPT | Mod: 26,,, | Performed by: RADIOLOGY

## 2024-02-08 PROCEDURE — 77067 SCR MAMMO BI INCL CAD: CPT | Mod: TC,PN

## 2024-02-08 PROCEDURE — 1160F RVW MEDS BY RX/DR IN RCRD: CPT | Mod: CPTII,S$GLB,, | Performed by: FAMILY MEDICINE

## 2024-02-08 NOTE — PROGRESS NOTES
"Subjective:      Patient ID: Cherie Sanders is a 63 y.o. female.    Chief Complaint: Hypertension      Vitals:    02/08/24 1030   BP: 128/72   Pulse: 95   Temp: 98.5 °F (36.9 °C)   SpO2: 99%   Weight: 98.8 kg (217 lb 11.3 oz)   Height: 5' 7" (1.702 m)        HPI   Has Meniere, sees ENT [facility], on triamterene, has hearing aids  Had vertigo testing done that caused vertigo  Had MRI brain normal        Problem List  Patient Active Problem List   Diagnosis    BPV (benign positional vertigo)    Paresthesia of tongue    Hyperlipidemia    Spondylosis without myelopathy or radiculopathy, cervical region        ALLERGIES: Review of patient's allergies indicates:  No Known Allergies    MEDS:   Current Outpatient Medications:     garlic (GARLIQUE) 5,000 mcg Tab, , Disp: , Rfl:     KRILL OIL ORAL, Take 1 capsule by mouth., Disp: , Rfl:     multivitamin capsule, Take 1 capsule by mouth once daily., Disp: , Rfl:     triamterene-hydrochlorothiazide 37.5-25 mg (DYAZIDE) 37.5-25 mg per capsule, Take 1 capsule by mouth every morning., Disp: , Rfl:       History:  Current Providers as of 2/8/2024  PCP: Chago Valencia MD  Care Team Provider: Torres Coughlin MD  Care Team Provider: SERVANDO Franco MD  Care Team Provider: Cara Mark LPN  Care Team Provider: Charlene Callahan MD  Care Team Provider: Torres Coughlin MD  Encounter Provider: Chago Valencia MD, starting on Thu Feb 8, 2024 12:00 AM  Referring Provider: not found, starting on Thu Feb 8, 2024 12:00 AM  Consulting Physician: Chago Valencia MD, starting on Thu Feb 8, 2024 10:23 AM (Active)   Past Medical History:   Diagnosis Date    Meniere disease      Past Surgical History:   Procedure Laterality Date    COLONOSCOPY  6/24/15    HYSTERECTOMY       Social History     Tobacco Use    Smoking status: Some Days     Types: Cigarettes    Smokeless tobacco: Current    Tobacco comments:     Last cigarette was Feb 2nd   Substance Use Topics    Alcohol use: " Yes     Comment: occ    Drug use: Never         Review of Systems   Constitutional:  Negative for activity change and unexpected weight change.   HENT:  Positive for hearing loss and rhinorrhea. Negative for trouble swallowing.    Eyes:  Negative for discharge.   Respiratory:  Negative for chest tightness and wheezing.    Cardiovascular:  Negative for chest pain and palpitations.   Gastrointestinal:  Positive for diarrhea. Negative for blood in stool, constipation and vomiting.   Endocrine: Negative for polydipsia and polyuria.   Genitourinary:  Negative for difficulty urinating, dysuria, hematuria and menstrual problem.   Musculoskeletal:  Negative for arthralgias, joint swelling and neck pain.   Neurological:  Negative for weakness and headaches.   Psychiatric/Behavioral:  Negative for confusion and dysphoric mood.    All other systems reviewed and are negative.    Objective:     Physical Exam  Vitals and nursing note reviewed.   Constitutional:       Appearance: She is well-developed.   HENT:      Head: Normocephalic.      Right Ear: Tympanic membrane and ear canal normal. There is no impacted cerumen.      Left Ear: Tympanic membrane and ear canal normal. There is no impacted cerumen.   Eyes:      Conjunctiva/sclera: Conjunctivae normal.      Pupils: Pupils are equal, round, and reactive to light.   Cardiovascular:      Rate and Rhythm: Normal rate and regular rhythm.      Heart sounds: Normal heart sounds.   Pulmonary:      Effort: Pulmonary effort is normal.      Breath sounds: Normal breath sounds.   Musculoskeletal:         General: Normal range of motion.      Cervical back: Normal range of motion and neck supple.   Skin:     General: Skin is warm and dry.   Neurological:      General: No focal deficit present.      Mental Status: She is alert and oriented to person, place, and time. Mental status is at baseline.      Deep Tendon Reflexes: Reflexes are normal and symmetric.   Psychiatric:         Mood and  Affect: Mood normal.         Behavior: Behavior normal.         Thought Content: Thought content normal.         Judgment: Judgment normal.             Assessment:     1. Annual physical exam    2. History of colon polyps    3. Benign paroxysmal positional vertigo, unspecified laterality    4. Hyperlipidemia, unspecified hyperlipidemia type    5. Ex-smoker      Plan:        Medication List            Accurate as of February 8, 2024 11:59 PM. If you have any questions, ask your nurse or doctor.                CONTINUE taking these medications      GARLIQUE 5,000 mcg Tab  Generic drug: garlic     KRILL OIL ORAL     multivitamin capsule     triamterene-hydrochlorothiazide 37.5-25 mg 37.5-25 mg per capsule  Commonly known as: DYAZIDE            STOP taking these medications      atorvastatin 40 MG tablet  Commonly known as: LIPITOR  Stopped by: Chago Valencia MD     cefdinir 300 MG capsule  Commonly known as: OMNICEF  Stopped by: Chago Valencia MD     chlorhexidine 0.12 % solution  Commonly known as: PERIDEX  Stopped by: Chago Valencia MD     ibuprofen 800 MG tablet  Commonly known as: ADVIL,MOTRIN  Stopped by: Chago Valencia MD     meclizine 25 mg tablet  Commonly known as: ANTIVERT  Stopped by: Chago Valencia MD     oxyCODONE-acetaminophen 5-325 mg per tablet  Commonly known as: PERCOCET  Stopped by: Chago Valencia MD     penicillin v potassium 500 MG tablet  Commonly known as: VEETID  Stopped by: Chago Valencia MD     phenazopyridine 200 MG tablet  Commonly known as: PYRIDIUM  Stopped by: Chago Valencia MD            Annual physical exam  -     Ambulatory referral/consult to Obstetrics / Gynecology; Future; Expected date: 02/15/2024  -     CBC Auto Differential; Future; Expected date: 02/08/2024  -     Comprehensive Metabolic Panel; Future; Expected date: 02/08/2024  -     Hemoglobin A1C; Future  -     Lipid Panel; Future  -     TSH; Future  -     Urinalysis; Future  -     Vitamin D; Future    History of  colon polyps  -     Ambulatory referral/consult to Gastroenterology; Future; Expected date: 02/15/2024  -     CBC Auto Differential; Future; Expected date: 02/08/2024  -     Comprehensive Metabolic Panel; Future; Expected date: 02/08/2024  -     Hemoglobin A1C; Future  -     Lipid Panel; Future  -     TSH; Future  -     Urinalysis; Future  -     Vitamin D; Future    Benign paroxysmal positional vertigo, unspecified laterality  -     CBC Auto Differential; Future; Expected date: 02/08/2024  -     Comprehensive Metabolic Panel; Future; Expected date: 02/08/2024  -     Hemoglobin A1C; Future  -     Lipid Panel; Future  -     TSH; Future  -     Urinalysis; Future  -     Vitamin D; Future    Hyperlipidemia, unspecified hyperlipidemia type  -     CBC Auto Differential; Future; Expected date: 02/08/2024  -     Comprehensive Metabolic Panel; Future; Expected date: 02/08/2024  -     Hemoglobin A1C; Future  -     Lipid Panel; Future  -     TSH; Future  -     Urinalysis; Future  -     Vitamin D; Future    Ex-smoker  -     CT Chest Lung Screening Low Dose; Future; Expected date: 02/08/2024

## 2024-02-15 ENCOUNTER — HOSPITAL ENCOUNTER (OUTPATIENT)
Dept: RADIOLOGY | Facility: HOSPITAL | Age: 64
Discharge: HOME | End: 2024-02-15
Attending: FAMILY MEDICINE
Payer: COMMERCIAL

## 2024-02-15 DIAGNOSIS — Z87.891 EX-SMOKER: ICD-10-CM

## 2024-02-15 PROCEDURE — 71271 CT THORAX LUNG CANCER SCR C-: CPT | Mod: 26,,, | Performed by: RADIOLOGY

## 2024-02-15 PROCEDURE — 71271 CT THORAX LUNG CANCER SCR C-: CPT | Mod: TC,PN

## 2024-02-19 ENCOUNTER — PATIENT MESSAGE (OUTPATIENT)
Dept: FAMILY MEDICINE | Facility: CLINIC | Age: 64
End: 2024-02-19
Payer: COMMERCIAL

## 2024-02-19 DIAGNOSIS — E78.5 HYPERLIPIDEMIA, UNSPECIFIED HYPERLIPIDEMIA TYPE: ICD-10-CM

## 2024-02-19 DIAGNOSIS — R73.9 HYPERGLYCEMIA: Primary | ICD-10-CM

## 2024-02-19 DIAGNOSIS — R91.1 SOLITARY PULMONARY NODULE: Primary | ICD-10-CM

## 2024-02-19 DIAGNOSIS — R22.2 LOCALIZED SWELLING, MASS AND LUMP, TRUNK: ICD-10-CM

## 2024-02-19 RX ORDER — OMEGA-3-ACID ETHYL ESTERS 1 G/1
2 CAPSULE, LIQUID FILLED ORAL 2 TIMES DAILY
Qty: 360 CAPSULE | Refills: 3 | Status: SHIPPED | OUTPATIENT
Start: 2024-02-19 | End: 2025-02-18

## 2024-02-20 ENCOUNTER — PATIENT OUTREACH (OUTPATIENT)
Dept: ADMINISTRATIVE | Facility: HOSPITAL | Age: 64
End: 2024-02-20
Payer: COMMERCIAL

## 2024-04-16 ENCOUNTER — OFFICE VISIT (OUTPATIENT)
Dept: OBSTETRICS AND GYNECOLOGY | Facility: CLINIC | Age: 64
End: 2024-04-16
Payer: COMMERCIAL

## 2024-04-16 VITALS
DIASTOLIC BLOOD PRESSURE: 84 MMHG | BODY MASS INDEX: 33.77 KG/M2 | WEIGHT: 215.63 LBS | SYSTOLIC BLOOD PRESSURE: 138 MMHG

## 2024-04-16 DIAGNOSIS — Z00.00 ANNUAL PHYSICAL EXAM: ICD-10-CM

## 2024-04-16 DIAGNOSIS — Z01.419 ENCOUNTER FOR ANNUAL ROUTINE GYNECOLOGICAL EXAMINATION: Primary | ICD-10-CM

## 2024-04-16 PROCEDURE — 99999 PR PBB SHADOW E&M-EST. PATIENT-LVL III: CPT | Mod: PBBFAC,,, | Performed by: OBSTETRICS & GYNECOLOGY

## 2024-04-16 PROCEDURE — 3044F HG A1C LEVEL LT 7.0%: CPT | Mod: CPTII,S$GLB,, | Performed by: OBSTETRICS & GYNECOLOGY

## 2024-04-16 PROCEDURE — 1159F MED LIST DOCD IN RCRD: CPT | Mod: CPTII,S$GLB,, | Performed by: OBSTETRICS & GYNECOLOGY

## 2024-04-16 PROCEDURE — 3008F BODY MASS INDEX DOCD: CPT | Mod: CPTII,S$GLB,, | Performed by: OBSTETRICS & GYNECOLOGY

## 2024-04-16 PROCEDURE — 3075F SYST BP GE 130 - 139MM HG: CPT | Mod: CPTII,S$GLB,, | Performed by: OBSTETRICS & GYNECOLOGY

## 2024-04-16 PROCEDURE — 1160F RVW MEDS BY RX/DR IN RCRD: CPT | Mod: CPTII,S$GLB,, | Performed by: OBSTETRICS & GYNECOLOGY

## 2024-04-16 PROCEDURE — 99386 PREV VISIT NEW AGE 40-64: CPT | Mod: S$GLB,,, | Performed by: OBSTETRICS & GYNECOLOGY

## 2024-04-16 PROCEDURE — 3079F DIAST BP 80-89 MM HG: CPT | Mod: CPTII,S$GLB,, | Performed by: OBSTETRICS & GYNECOLOGY

## 2024-04-16 RX ORDER — TRIAMTERENE AND HYDROCHLOROTHIAZIDE 75; 50 MG/1; MG/1
1 TABLET ORAL
COMMUNITY
Start: 2024-04-12

## 2024-04-16 NOTE — PROGRESS NOTES
Chief Complaint   Patient presents with    Well Woman       HISTORY OF PRESENT ILLNESS:   Cherie Sanders is a 63 y.o. female  with h/o hysterectomy who presents for well woman exam.  No LMP recorded. Patient has had a hysterectomy. 2/2 endo; left ovaries, thinks it was lsc though did have open surgery for endo. She has no complaints.  Does get a recurrent boil in left labia but not there now, will get large and drain, no where lese on her body.       Past Medical History:   Diagnosis Date    Meniere disease         Past Surgical History:   Procedure Laterality Date    COLONOSCOPY  06/24/2015    COLONOSCOPY  03/20/2024    HYSTERECTOMY         Social History     Socioeconomic History    Marital status:      Spouse name: Matteo   Tobacco Use    Smoking status: Some Days     Types: Cigarettes    Smokeless tobacco: Current    Tobacco comments:     Last cigarette was Feb 2nd   Substance and Sexual Activity    Alcohol use: Yes     Comment: occ    Drug use: Never     Social Determinants of Health     Financial Resource Strain: Low Risk  (2/7/2024)    Overall Financial Resource Strain (CARDIA)     Difficulty of Paying Living Expenses: Not hard at all   Food Insecurity: No Food Insecurity (2/7/2024)    Hunger Vital Sign     Worried About Running Out of Food in the Last Year: Never true     Ran Out of Food in the Last Year: Never true   Transportation Needs: No Transportation Needs (2/7/2024)    PRAPARE - Transportation     Lack of Transportation (Medical): No     Lack of Transportation (Non-Medical): No   Physical Activity: Inactive (2/7/2024)    Exercise Vital Sign     Days of Exercise per Week: 0 days     Minutes of Exercise per Session: 0 min   Stress: Stress Concern Present (2/7/2024)    [employer] - Occupational Stress Questionnaire     Feeling of Stress : To some extent   Social Connections: Unknown (2/7/2024)    Social Connection and Isolation Panel [NHANES]     Frequency of  Communication with Friends and Family: More than three times a week     Frequency of Social Gatherings with Friends and Family: Patient declined     Active Member of Clubs or Organizations: No     Attends Club or Organization Meetings: Patient declined     Marital Status:    Housing Stability: Low Risk  (2024)    Housing Stability Vital Sign     Unable to Pay for Housing in the Last Year: No     Number of Places Lived in the Last Year: 1     Unstable Housing in the Last Year: No       Family History   Problem Relation Name Age of Onset    Hodgkin's lymphoma Father      No Known Problems Mother      Cancer Sister          leukemia    Heart disease Brother      No Known Problems Sister         OB History    Para Term  AB Living   1 1 1         SAB IAB Ectopic Multiple Live Births                  # Outcome Date GA Lbr Faizan/2nd Weight Sex Type Anes PTL Lv   1 Term                COMPREHENSIVE GYN HISTORY:  PAP History: Denies abnormal Paps  Infection History: Denies STDs. Denies PID.  Benign History: Denies uterine fibroids. Denies ovarian cysts. HAS endometriosis Denies other conditions.  Cancer History: Denies cervical cancer. Denies uterine cancer or hyperplasia. Denies ovarian cancer. Denies vulvar cancer or pre-cancer. Denies vaginal cancer or pre-cancer. Denies breast cancer. Denies colon cancer.  Cycle: nl age/monthly but painful   Had TLH in  endo, still has ovaries   Had open ex lap for endo and some surgery to make uterus in good position     ROS:  neg      Wt 97.8 kg (215 lb 9.8 oz)   BMI 33.77 kg/m²     APPEARANCE: Well nourished, well developed, in no acute distress.    ABDOMEN: Soft. No tenderness or masses. No hernias. No hepatosplenomegaly.  BREASTS: Symmetrical, no skin changes or visible lesions. No palpable masses, nipple discharge or adenopathy bilaterally.  PELVIC:   VULVA: No lesions. Normal female genitalia.  URETHRAL MEATUS: Normal size and location, no lesions,  no prolapse.  URETHRA: No masses, tenderness, prolapse or scarring.  VAGINA: atrophy, no discharge, no significant cystocele or rectocele.  CERVIX:absent non-tender, bladder base nontender.  ADNEXA: No masses or tenderness.  PERINEUM: Normal, mo masses    Data Reviewed:     Last MMG: Date: 2/2024 negative   Lipid profile: Date:   Lab Results   Component Value Date    CHOL 280 (H) 02/15/2024    CHOL 271 (H) 01/14/2021    CHOL 270 (H) 10/20/2020     Lab Results   Component Value Date    HDL 40 02/15/2024    HDL 37 (L) 01/14/2021    HDL 36 (L) 10/20/2020     Lab Results   Component Value Date    LDLCALC 202.4 (H) 02/15/2024    LDLCALC 179.8 (H) 01/14/2021    LDLCALC Invalid, Trig>400.0 10/20/2020     Lab Results   Component Value Date    TRIG 188 (H) 02/15/2024    TRIG 271 (H) 01/14/2021    TRIG 413 (H) 10/20/2020     Lab Results   Component Value Date    CHOLHDL 14.3 (L) 02/15/2024    CHOLHDL 13.7 (L) 01/14/2021    CHOLHDL 13.3 (L) 10/20/2020     TSH:   Lab Results   Component Value Date    TSH 2.470 02/15/2024     Colonoscopy Date: 2019, repeat 5 years; reports done this yea 2024 at outside facility  Had DXA in the past but not recently     1. Annual physical exam        A/P 1. Routine gyn annual exam. s/p normal breast exam and MMG ordered.  Pap with HPV cotesting not indicated.  Lipid Profile, needed every 5 years, up to date. Fasting glucose, needed every 3 years, up to date.   TSH, needed every 5 years, up to date.   Colonoscopy up to date.     F/u in 1 yr or PRN

## 2024-05-21 ENCOUNTER — PATIENT MESSAGE (OUTPATIENT)
Dept: ADMINISTRATIVE | Facility: HOSPITAL | Age: 64
End: 2024-05-21
Payer: COMMERCIAL

## 2024-06-07 ENCOUNTER — PATIENT MESSAGE (OUTPATIENT)
Dept: FAMILY MEDICINE | Facility: CLINIC | Age: 64
End: 2024-06-07
Payer: COMMERCIAL

## 2024-06-07 ENCOUNTER — TELEPHONE (OUTPATIENT)
Dept: FAMILY MEDICINE | Facility: CLINIC | Age: 64
End: 2024-06-07
Payer: COMMERCIAL

## 2024-06-07 RX ORDER — CIPROFLOXACIN 500 MG/1
500 TABLET ORAL 2 TIMES DAILY
Qty: 14 TABLET | Refills: 0 | Status: SHIPPED | OUTPATIENT
Start: 2024-06-07

## 2024-06-07 NOTE — TELEPHONE ENCOUNTER
----- Message from Casi Menon sent at 6/7/2024 11:07 AM CDT -----  Type:  Patient Returning Call    Who Called: pt  Who Left Message for Patient: pt said Maeve  Does the patient know what this is regarding?:   Would the patient rather a call back or a response via [email or web address]?  Call   Best Call Back Number: [phone]  Additional Information:

## 2024-06-07 NOTE — TELEPHONE ENCOUNTER
Pt is complaining of back pain, urinary frequency, and pain with urination x last night. She felt like she may have had fever last night, but isn't certain.     [facility]- [facility]    She is requesting that you send in something to pharmacy for her.

## 2024-06-07 NOTE — TELEPHONE ENCOUNTER
----- Message from Padmini Patiño sent at 6/7/2024  9:46 AM CDT -----  .Type:  Needs Medical Advice    Who Called: pt    Would the patient rather a call back or a response via [facility]? Call back  Best Call Back Number: [phone]  Additional Information:     Pt stated she would like a call back about getting medication called in for a UTI    I called the pt LM with details  What are her symptoms and what pharmacy?

## 2024-08-05 ENCOUNTER — HOSPITAL ENCOUNTER (OUTPATIENT)
Dept: RADIOLOGY | Facility: HOSPITAL | Age: 64
Discharge: HOME | End: 2024-08-05
Attending: FAMILY MEDICINE
Payer: COMMERCIAL

## 2024-08-05 DIAGNOSIS — R91.1 SOLITARY PULMONARY NODULE: ICD-10-CM

## 2024-08-05 DIAGNOSIS — R22.2 LOCALIZED SWELLING, MASS AND LUMP, TRUNK: ICD-10-CM

## 2024-08-05 PROCEDURE — 71250 CT THORAX DX C-: CPT | Mod: TC,PN

## 2024-08-05 PROCEDURE — 71250 CT THORAX DX C-: CPT | Mod: 26,,, | Performed by: RADIOLOGY

## 2024-08-20 ENCOUNTER — PATIENT MESSAGE (OUTPATIENT)
Dept: FAMILY MEDICINE | Facility: CLINIC | Age: 64
End: 2024-08-20
Payer: COMMERCIAL

## 2024-08-22 ENCOUNTER — PATIENT OUTREACH (OUTPATIENT)
Dept: ADMINISTRATIVE | Facility: HOSPITAL | Age: 64
End: 2024-08-22
Payer: COMMERCIAL

## 2024-08-22 NOTE — LETTER
AUTHORIZATION FOR RELEASE OF   CONFIDENTIAL INFORMATION    Dr Coughlin,    We are seeing Cherie Sanders, date of birth 1960, in the clinic at [facility]. Chago Valencia MD is the patient's PCP. Cherie Sanders has an outstanding lab/procedure at the time we reviewed her chart. In order to help keep her health information updated, she has authorized us to request the following medical record(s):                               ( X )  COLONOSCOPY                Please fax records to [facility], Bailey, Colin G, MD, [phone]    If you have any questions, please contact Cara at [phone].           Patient Name: Cherie Sanders  : 1960  Patient Phone #: [phone]

## 2024-10-09 DIAGNOSIS — Z78.0 MENOPAUSE: ICD-10-CM

## 2024-10-30 ENCOUNTER — HOSPITAL ENCOUNTER (OUTPATIENT)
Dept: RADIOLOGY | Facility: HOSPITAL | Age: 64
Discharge: HOME | End: 2024-10-30
Attending: FAMILY MEDICINE
Payer: COMMERCIAL

## 2024-10-30 DIAGNOSIS — Z78.0 MENOPAUSE: ICD-10-CM

## 2024-10-30 PROCEDURE — 77080 DXA BONE DENSITY AXIAL: CPT | Mod: TC,PN

## 2024-10-30 PROCEDURE — 77080 DXA BONE DENSITY AXIAL: CPT | Mod: 26,,, | Performed by: RADIOLOGY

## 2025-02-10 ENCOUNTER — OFFICE VISIT (OUTPATIENT)
Dept: FAMILY MEDICINE | Facility: CLINIC | Age: 65
End: 2025-02-10
Payer: MEDICARE

## 2025-02-10 VITALS
SYSTOLIC BLOOD PRESSURE: 132 MMHG | WEIGHT: 227.5 LBS | OXYGEN SATURATION: 98 % | HEIGHT: 67 IN | TEMPERATURE: 98 F | DIASTOLIC BLOOD PRESSURE: 88 MMHG | BODY MASS INDEX: 35.71 KG/M2 | HEART RATE: 81 BPM

## 2025-02-10 DIAGNOSIS — Z23 NEED FOR PNEUMOCOCCAL 20-VALENT CONJUGATE VACCINATION: Primary | ICD-10-CM

## 2025-02-10 DIAGNOSIS — M94.9 DISORDER OF CARTILAGE, UNSPECIFIED: ICD-10-CM

## 2025-02-10 DIAGNOSIS — Z00.00 ANNUAL PHYSICAL EXAM: ICD-10-CM

## 2025-02-10 DIAGNOSIS — E78.5 HYPERLIPIDEMIA, UNSPECIFIED HYPERLIPIDEMIA TYPE: ICD-10-CM

## 2025-02-10 DIAGNOSIS — Z12.31 ENCOUNTER FOR SCREENING MAMMOGRAM FOR MALIGNANT NEOPLASM OF BREAST: ICD-10-CM

## 2025-02-10 DIAGNOSIS — R73.9 HYPERGLYCEMIA: ICD-10-CM

## 2025-02-10 PROCEDURE — G0009 ADMIN PNEUMOCOCCAL VACCINE: HCPCS | Mod: S$GLB,,, | Performed by: FAMILY MEDICINE

## 2025-02-10 PROCEDURE — 99214 OFFICE O/P EST MOD 30 MIN: CPT | Mod: S$GLB,,, | Performed by: FAMILY MEDICINE

## 2025-02-10 PROCEDURE — 90677 PCV20 VACCINE IM: CPT | Mod: S$GLB,,, | Performed by: FAMILY MEDICINE

## 2025-02-10 RX ORDER — TRIAMTERENE AND HYDROCHLOROTHIAZIDE 75; 50 MG/1; MG/1
1 TABLET ORAL DAILY
Qty: 90 TABLET | Refills: 3 | Status: SHIPPED | OUTPATIENT
Start: 2025-02-10

## 2025-02-10 RX ORDER — CETIRIZINE HYDROCHLORIDE 10 MG/1
10 TABLET ORAL DAILY
COMMUNITY
Start: 2024-11-14

## 2025-02-10 NOTE — PROGRESS NOTES
"Subjective:      Patient ID: Cherie Sanders is a 64 y.o. female.    Chief Complaint: Annual Exam      Vitals:    02/10/25 1116   BP: 132/88   Pulse: 81   Temp: 98.1 °F (36.7 °C)   TempSrc: Oral   SpO2: 98%   Weight: 103.2 kg (227 lb 8.2 oz)   Height: 5' 7" (1.702 m)        HPI   Annual; below Dx; going to be a grandmother  Has meniers hearing loss, hearing aids  Goes to ENT in [facility] office\  Due labs, on jagdish 3  Has had tot chol  Can't take stgatin  s    Problem List  Patient Active Problem List   Diagnosis    BPV (benign positional vertigo)    Paresthesia of tongue    Hyperlipidemia    Spondylosis without myelopathy or radiculopathy, cervical region        ALLERGIES: Review of patient's allergies indicates:  No Known Allergies    MEDS:   Current Outpatient Medications:     cetirizine (ZYRTEC) 10 MG tablet, Take 10 mg by mouth once daily., Disp: , Rfl:     KRILL OIL ORAL, Take 1 capsule by mouth., Disp: , Rfl:     multivitamin capsule, Take 1 capsule by mouth once daily., Disp: , Rfl:     omega-3 acid ethyl esters (LOVAZA) 1 gram capsule, Take 2 capsules (2 g total) by mouth 2 (two) times daily., Disp: 360 capsule, Rfl: 3    triamterene-hydrochlorothiazide 75-50 mg (MAXZIDE) 75-50 mg per tablet, Take 1 tablet by mouth., Disp: , Rfl:     ciprofloxacin HCl (CIPRO) 500 MG tablet, Take 1 tablet (500 mg total) by mouth 2 (two) times daily., Disp: 14 tablet, Rfl: 0    garlic (GARLIQUE) 5,000 mcg Tab, , Disp: , Rfl:     triamterene-hydrochlorothiazide 37.5-25 mg (DYAZIDE) 37.5-25 mg per capsule, Take 1 capsule by mouth every morning., Disp: , Rfl:   No current facility-administered medications for this visit.      History:  Current Providers as of 2/10/2025  PCP: Chago Valencia MD  Care Team Provider: Torres Coughlin MD  Care Team Provider: SERVANDO Franco MD  Care Team Provider: Cara Mark LPN  Care Team Provider: Charlene Callahan MD  Care Team Provider: Torres Coughlin MD  Encounter Provider: " Chago Valencia MD, starting on Mon Feb 10, 2025 12:00 AM  Referring Provider: not found, starting on Mon Feb 10, 2025 12:00 AM  Consulting Physician: Chago Valencia MD, starting on  11:23 AM (Active)   Past Medical History:   Diagnosis Date    Meniere disease      Past Surgical History:   Procedure Laterality Date    COLONOSCOPY  2015    COLONOSCOPY  2024    FRACTURE SURGERY  2012    right ankle and fibula    HYSTERECTOMY       Social History     Tobacco Use    Smoking status: Former     Current packs/day: 0.00     Average packs/day: 0.3 packs/day for 50.1 years (12.5 ttl pk-yrs)     Types: Cigarettes     Start date: 1974     Quit date: 2024     Years since quittin.0     Passive exposure: Never    Smokeless tobacco: Current    Tobacco comments:     Last cigarette was    Substance Use Topics    Alcohol use: Yes     Alcohol/week: 1.0 standard drink of alcohol     Types: 1 Glasses of wine per week     Comment: occ    Drug use: Never         Review of Systems  Objective:     Physical Exam        Assessment:     1. Need for pneumococcal 20-valent conjugate vaccination    2. Encounter for screening mammogram for malignant neoplasm of breast      Plan:        Medication List            Accurate as of February 10, 2025 12:06 PM. If you have any questions, ask your nurse or doctor.                CONTINUE taking these medications      cetirizine 10 MG tablet  Commonly known as: ZYRTEC     ciprofloxacin HCl 500 MG tablet  Commonly known as: CIPRO  Take 1 tablet (500 mg total) by mouth 2 (two) times daily.     GARLIQUE 5,000 mcg Tab  Generic drug: garlic     KRILL OIL ORAL     multivitamin capsule     omega-3 acid ethyl esters 1 gram capsule  Commonly known as: LOVAZA  Take 2 capsules (2 g total) by mouth 2 (two) times daily.     * triamterene-hydrochlorothiazide 37.5-25 mg 37.5-25 mg per capsule  Commonly known as: DYAZIDE     * triamterene-hydrochlorothiazide 75-50 mg 75-50  mg per tablet  Commonly known as: MAXZIDE           * This list has 2 medication(s) that are the same as other medications prescribed for you. Read the directions carefully, and ask your doctor or other care provider to review them with you.                Need for pneumococcal 20-valent conjugate vaccination  -     pneumoc 20-benjamín conj-dip cr(PF) (PREVNAR-20 (PF)) injection Syrg 0.5 mL    Encounter for screening mammogram for malignant neoplasm of breast  -     Mammo Digital Screening Bilat w/ Anthony; Future; Expected date: 02/10/2025

## 2025-03-03 ENCOUNTER — PATIENT OUTREACH (OUTPATIENT)
Dept: ADMINISTRATIVE | Facility: HOSPITAL | Age: 65
End: 2025-03-03
Payer: MEDICARE

## 2025-03-03 NOTE — PROGRESS NOTES
Population Health Chart Review & Patient Outreach Details      Additional [facility] Health Notes:               Updates Requested / Reviewed:      Updated Care Coordination Note, Care Everywhere, and Immunizations Reconciliation Completed or Queried: [facility] Topics Overdue:      [facility] Score: 1     Colon Cancer Screening                       Health Maintenance Topic(s) Outreach Outcomes & Actions Taken:    Colorectal Cancer Screening - Outreach Outcomes & Actions Taken  : External Records Requested & Care Team Updated if Applicable and Efax to Dr. Torres Coughlin

## 2025-03-03 NOTE — LETTER
AUTHORIZATION FOR RELEASE OF   CONFIDENTIAL INFORMATION    Dear Dr. Coughlin,    We are seeing Cherie Sanders, date of birth 1960, in the clinic at [facility] MEDICINE. Chago Valencia MD is the patient's PCP. Cherie Sanders has an outstanding lab/procedure at the time we reviewed her chart. In order to help keep her health information updated, she has authorized us to request the following medical record(s):                                             ( X )  EYE EXAM                   Please fax records to [facility], Bailey, Colin G, MD, [phone]             Patient Name: Cherie Sanders  : 1960  Patient Phone #: [phone]

## 2025-03-19 ENCOUNTER — HOSPITAL ENCOUNTER (OUTPATIENT)
Dept: RADIOLOGY | Facility: HOSPITAL | Age: 65
Discharge: HOME | End: 2025-03-19
Attending: FAMILY MEDICINE
Payer: MEDICARE

## 2025-03-19 DIAGNOSIS — Z12.31 ENCOUNTER FOR SCREENING MAMMOGRAM FOR MALIGNANT NEOPLASM OF BREAST: ICD-10-CM

## 2025-03-19 PROCEDURE — 77067 SCR MAMMO BI INCL CAD: CPT | Mod: TC,PN

## 2025-03-19 PROCEDURE — 77063 BREAST TOMOSYNTHESIS BI: CPT | Mod: 26,,, | Performed by: RADIOLOGY

## 2025-03-19 PROCEDURE — 77067 SCR MAMMO BI INCL CAD: CPT | Mod: 26,,, | Performed by: RADIOLOGY

## 2025-03-28 ENCOUNTER — RESULTS FOLLOW-UP (OUTPATIENT)
Dept: FAMILY MEDICINE | Facility: CLINIC | Age: 65
End: 2025-03-28

## 2025-04-16 RX ORDER — NITROFURANTOIN (MACROCRYSTALS) 100 MG/1
100 CAPSULE ORAL EVERY 12 HOURS
Qty: 14 CAPSULE | Refills: 0 | Status: SHIPPED | OUTPATIENT
Start: 2025-04-16